# Patient Record
Sex: MALE | Race: WHITE | NOT HISPANIC OR LATINO | Employment: FULL TIME | URBAN - METROPOLITAN AREA
[De-identification: names, ages, dates, MRNs, and addresses within clinical notes are randomized per-mention and may not be internally consistent; named-entity substitution may affect disease eponyms.]

---

## 2017-01-15 DIAGNOSIS — R60.0 BILATERAL EDEMA OF LOWER EXTREMITY: ICD-10-CM

## 2017-01-16 RX ORDER — POTASSIUM CHLORIDE 750 MG/1
CAPSULE, EXTENDED RELEASE ORAL
Qty: 30 CAPSULE | Refills: 5 | Status: SHIPPED | OUTPATIENT
Start: 2017-01-16 | End: 2017-09-12 | Stop reason: SDUPTHER

## 2017-01-16 RX ORDER — FUROSEMIDE 40 MG/1
TABLET ORAL
Qty: 30 TABLET | Refills: 5 | Status: SHIPPED | OUTPATIENT
Start: 2017-01-16 | End: 2017-09-12

## 2017-02-20 RX ORDER — BENAZEPRIL HYDROCHLORIDE 40 MG/1
TABLET, FILM COATED ORAL
Qty: 90 TABLET | Refills: 3 | Status: SHIPPED | OUTPATIENT
Start: 2017-02-20 | End: 2017-09-12 | Stop reason: SDUPTHER

## 2017-05-16 ENCOUNTER — RESULTS ENCOUNTER (OUTPATIENT)
Dept: FAMILY MEDICINE CLINIC | Facility: CLINIC | Age: 47
End: 2017-05-16

## 2017-05-16 DIAGNOSIS — E78.1 HIGH BLOOD TRIGLYCERIDES: ICD-10-CM

## 2017-05-16 DIAGNOSIS — I15.2 HYPERTENSION DUE TO ENDOCRINE DISORDER: ICD-10-CM

## 2017-09-06 LAB
ALBUMIN SERPL-MCNC: 4.5 G/DL (ref 3.5–5.2)
ALBUMIN/GLOB SERPL: 1.5 G/DL
ALP SERPL-CCNC: 70 U/L (ref 40–129)
ALT SERPL-CCNC: 29 U/L (ref 5–41)
APPEARANCE UR: CLEAR
AST SERPL-CCNC: 24 U/L (ref 5–40)
BILIRUB SERPL-MCNC: 0.7 MG/DL (ref 0.2–1.2)
BILIRUB UR QL STRIP: NEGATIVE
BUN SERPL-MCNC: 18 MG/DL (ref 6–20)
BUN/CREAT SERPL: 17.1 (ref 7–25)
CALCIUM SERPL-MCNC: 9.9 MG/DL (ref 8.6–10.5)
CHLORIDE SERPL-SCNC: 97 MMOL/L (ref 98–107)
CHOLEST SERPL-MCNC: 188 MG/DL (ref 0–200)
CO2 SERPL-SCNC: 28.4 MMOL/L (ref 22–29)
COLOR UR: YELLOW
CREAT SERPL-MCNC: 1.05 MG/DL (ref 0.76–1.27)
ERYTHROCYTE [DISTWIDTH] IN BLOOD BY AUTOMATED COUNT: 13.4 % (ref 11.5–14.5)
GLOBULIN SER CALC-MCNC: 3 GM/DL
GLUCOSE SERPL-MCNC: 91 MG/DL (ref 65–99)
GLUCOSE UR QL: NEGATIVE
HCT VFR BLD AUTO: 47.9 % (ref 42–52)
HDLC SERPL-MCNC: 37 MG/DL (ref 40–60)
HGB BLD-MCNC: 15.9 G/DL (ref 14–18)
HGB UR QL STRIP: NEGATIVE
KETONES UR QL STRIP: NEGATIVE
LDLC SERPL CALC-MCNC: 102 MG/DL (ref 0–100)
LEUKOCYTE ESTERASE UR QL STRIP: NEGATIVE
MCH RBC QN AUTO: 28.6 PG (ref 27–31)
MCHC RBC AUTO-ENTMCNC: 33.2 G/DL (ref 31–37)
MCV RBC AUTO: 86.3 FL (ref 80–94)
NITRITE UR QL STRIP: NEGATIVE
PH UR STRIP: 5.5 [PH] (ref 4.5–8)
PLATELET # BLD AUTO: 248 10*3/MM3 (ref 140–500)
POTASSIUM SERPL-SCNC: 4.6 MMOL/L (ref 3.5–5.2)
PROT SERPL-MCNC: 7.5 G/DL (ref 6–8.5)
PROT UR QL STRIP: NEGATIVE
RBC # BLD AUTO: 5.55 10*6/MM3 (ref 4.7–6.1)
SODIUM SERPL-SCNC: 139 MMOL/L (ref 136–145)
SP GR UR: 1.03 (ref 1–1.03)
TRIGL SERPL-MCNC: 245 MG/DL (ref 0–150)
TSH SERPL DL<=0.005 MIU/L-ACNC: 1.11 MIU/ML (ref 0.27–4.2)
UROBILINOGEN UR STRIP-MCNC: NORMAL MG/DL
VLDLC SERPL CALC-MCNC: 49 MG/DL (ref 8–32)
WBC # BLD AUTO: 8.95 10*3/MM3 (ref 4.8–10.8)

## 2017-09-12 ENCOUNTER — OFFICE VISIT (OUTPATIENT)
Dept: FAMILY MEDICINE CLINIC | Facility: CLINIC | Age: 47
End: 2017-09-12

## 2017-09-12 VITALS
OXYGEN SATURATION: 98 % | SYSTOLIC BLOOD PRESSURE: 140 MMHG | BODY MASS INDEX: 40.43 KG/M2 | DIASTOLIC BLOOD PRESSURE: 86 MMHG | TEMPERATURE: 97.9 F | HEIGHT: 74 IN | HEART RATE: 86 BPM | WEIGHT: 315 LBS

## 2017-09-12 DIAGNOSIS — R60.0 BILATERAL EDEMA OF LOWER EXTREMITY: ICD-10-CM

## 2017-09-12 DIAGNOSIS — F41.1 ANXIETY, GENERALIZED: Primary | ICD-10-CM

## 2017-09-12 DIAGNOSIS — I15.2 HYPERTENSION DUE TO ENDOCRINE DISORDER: ICD-10-CM

## 2017-09-12 DIAGNOSIS — Z30.09 VASECTOMY EVALUATION: ICD-10-CM

## 2017-09-12 PROCEDURE — 99214 OFFICE O/P EST MOD 30 MIN: CPT | Performed by: FAMILY MEDICINE

## 2017-09-12 RX ORDER — SPIRONOLACTONE 25 MG/1
25 TABLET ORAL DAILY
Qty: 90 TABLET | Refills: 3 | Status: SHIPPED | OUTPATIENT
Start: 2017-09-12 | End: 2018-09-19 | Stop reason: SDUPTHER

## 2017-09-12 RX ORDER — VENLAFAXINE HYDROCHLORIDE 75 MG/1
75 CAPSULE, EXTENDED RELEASE ORAL DAILY
Qty: 30 CAPSULE | Refills: 5 | Status: SHIPPED | OUTPATIENT
Start: 2017-09-12 | End: 2017-11-16 | Stop reason: DRUGHIGH

## 2017-09-12 RX ORDER — FUROSEMIDE 40 MG/1
40 TABLET ORAL DAILY PRN
Qty: 30 TABLET | Refills: 5 | Status: SHIPPED | OUTPATIENT
Start: 2017-09-12 | End: 2018-03-11 | Stop reason: SDUPTHER

## 2017-09-12 RX ORDER — BENAZEPRIL HYDROCHLORIDE 40 MG/1
40 TABLET, FILM COATED ORAL DAILY
Qty: 90 TABLET | Refills: 3 | Status: SHIPPED | OUTPATIENT
Start: 2017-09-12 | End: 2018-10-09 | Stop reason: SDUPTHER

## 2017-09-12 RX ORDER — VENLAFAXINE HYDROCHLORIDE 37.5 MG/1
37.5 CAPSULE, EXTENDED RELEASE ORAL DAILY
Qty: 7 CAPSULE | Refills: 0 | Status: SHIPPED | OUTPATIENT
Start: 2017-09-12 | End: 2017-11-16 | Stop reason: DRUGHIGH

## 2017-09-12 RX ORDER — POTASSIUM CHLORIDE 750 MG/1
10 CAPSULE, EXTENDED RELEASE ORAL DAILY PRN
Qty: 30 CAPSULE | Refills: 5 | Status: SHIPPED | OUTPATIENT
Start: 2017-09-12 | End: 2018-03-11 | Stop reason: SDUPTHER

## 2017-09-12 NOTE — PROGRESS NOTES
Chief Complaint   Patient presents with   • Follow-up   • Hypertension     uncontrolled, ran out of meds   • Anxiety     uncontrolled       Subjective     Patient here for follow-up of elevated blood pressure.    He is not exercising and is adherent to a low-salt diet.    Blood pressure is not well controlled at home.   Cardiac symptoms: dyspnea, fatigue and lower extremity edema.   Patient denies: chest pain.   Cardiovascular risk factors: hypertension, male gender and obesity (BMI >= 30 kg/m2).   Use of agents associated with hypertension: none.   History of target organ damage: none.  Patient is NOT taking prescribed hypertension medications as prescribed without side effects.    Ran out of Aldactone and lasix.    He and wife have decided for a vasectomy  They have 2 children  Each pregnancy was high risk    Anxiety is not much better  Wellbutrin not helping much      The following portions of the patient's history were reviewed and updated as appropriate: allergies, current medications, past family history, past medical history, past social history, past surgical history and problem list.    Review of Systems  A comprehensive review of systems was negative except for: Behavioral/Psych: positive for anxiety    Results for orders placed or performed in visit on 05/16/17   Lipid Panel   Result Value Ref Range    Total Cholesterol 188 0 - 200 mg/dL    Triglycerides 245 (H) 0 - 150 mg/dL    HDL Cholesterol 37 (L) 40 - 60 mg/dL    VLDL Cholesterol 49 (H) 8 - 32 mg/dL    LDL Cholesterol  102 (H) 0 - 100 mg/dL   CBC (No Diff)   Result Value Ref Range    WBC 8.95 4.80 - 10.80 10*3/mm3    RBC 5.55 4.70 - 6.10 10*6/mm3    Hemoglobin 15.9 14.0 - 18.0 g/dL    Hematocrit 47.9 42.0 - 52.0 %    MCV 86.3 80.0 - 94.0 fL    MCH 28.6 27.0 - 31.0 pg    MCHC 33.2 31.0 - 37.0 g/dL    RDW 13.4 11.5 - 14.5 %    Platelets 248 140 - 500 10*3/mm3   Comprehensive Metabolic Panel   Result Value Ref Range    Glucose 91 65 - 99 mg/dL    BUN  "18 6 - 20 mg/dL    Creatinine 1.05 0.76 - 1.27 mg/dL    eGFR Non African Am 76 >60 mL/min/1.73    eGFR African Am 93 >60 mL/min/1.73    BUN/Creatinine Ratio 17.1 7.0 - 25.0    Sodium 139 136 - 145 mmol/L    Potassium 4.6 3.5 - 5.2 mmol/L    Chloride 97 (L) 98 - 107 mmol/L    Total CO2 28.4 22.0 - 29.0 mmol/L    Calcium 9.9 8.6 - 10.5 mg/dL    Total Protein 7.5 6.0 - 8.5 g/dL    Albumin 4.50 3.50 - 5.20 g/dL    Globulin 3.0 gm/dL    A/G Ratio 1.5 g/dL    Total Bilirubin 0.7 0.2 - 1.2 mg/dL    Alkaline Phosphatase 70 40 - 129 U/L    AST (SGOT) 24 5 - 40 U/L    ALT (SGPT) 29 5 - 41 U/L   TSH   Result Value Ref Range    TSH 1.110 0.270 - 4.200 mIU/mL   Urinalysis With / Microscopic If Indicated   Result Value Ref Range    Specific Gravity, UA 1.029 1.003 - 1.030    pH, UA 5.5 4.5 - 8.0    Color, UA Yellow     Appearance, UA Clear Clear    Leukocytes, UA Negative Negative    Protein Negative Negative    Glucose, UA Negative Negative    Ketones Negative     Blood, UA Negative Negative    Bilirubin, UA Negative Negative    Urobilinogen, UA Comment     Nitrite, UA Negative Negative        Vitals:    09/12/17 1604   BP: 140/86   BP Location: Left arm   Patient Position: Sitting   Pulse: 86   Temp: 97.9 °F (36.6 °C)   SpO2: 98%   Weight: (!) 405 lb 9.6 oz (184 kg)   Height: 74\" (188 cm)     Objective    Gen: alert, pleasant.  HEENT: PERRL, EOMI intact, lids ok, ear canals clear, TMs normal, throat clear, nostrils normal  Neck: no bruit, no enlarged thyroid  Lungs: CTA  Heart: RR no murmur  ABD: soft , + BS  Pulses: intact  Mood: stable     Assessment/Plan   Hypertension, prehypertension Evidence of target organ damage: none.    Jerome was seen today for follow-up, hypertension and anxiety.    Diagnoses and all orders for this visit:    Anxiety, generalized  -     venlafaxine XR (EFFEXOR XR) 37.5 MG 24 hr capsule; Take 1 capsule by mouth Daily.  -     venlafaxine XR (EFFEXOR XR) 75 MG 24 hr capsule; Take 1 capsule by mouth " Daily.    Bilateral edema of lower extremity  -     potassium chloride (MICRO-K) 10 MEQ CR capsule; Take 1 capsule by mouth Daily As Needed (take with lasix).  -     furosemide (LASIX) 40 MG tablet; Take 1 tablet by mouth Daily As Needed (leg swelling).    Hypertension due to endocrine disorder  -     spironolactone (ALDACTONE) 25 MG tablet; Take 1 tablet by mouth Daily. Indications: Edema  -     benazepril (LOTENSIN) 40 MG tablet; Take 1 tablet by mouth Daily. Indications: High Blood Pressure Disorder    Vasectomy evaluation  -     Ambulatory Referral to Urology      Medication: resume aldactone.  Dietary sodium restriction.  Follow up: 1 month and as needed.    There are no Patient Instructions on file for this visit.  Medications Discontinued During This Encounter   Medication Reason   • furosemide (LASIX) 40 MG tablet Therapy completed   • potassium chloride (MICRO-K) 10 MEQ CR capsule Duplicate order   • spironolactone (ALDACTONE) 25 MG tablet Reorder   • potassium chloride (MICRO-K) 10 MEQ CR capsule Reorder   • benazepril (LOTENSIN) 40 MG tablet Reorder   • buPROPion XL (WELLBUTRIN XL) 300 MG 24 hr tablet Not Efficacious        Return in about 1 month (around 10/12/2017) for blood pressure, new medication follow up.    Limit salt  Limit alcoholic drinks to 1 a day  Limit caffeine to 1-2 servings a day    Dr. Shahab Sheriff MD  [place], [place]  [facility].

## 2017-11-16 DIAGNOSIS — F41.1 ANXIETY, GENERALIZED: ICD-10-CM

## 2017-11-16 RX ORDER — VENLAFAXINE HYDROCHLORIDE 75 MG/1
75 CAPSULE, EXTENDED RELEASE ORAL DAILY
Qty: 90 CAPSULE | Refills: 0 | Status: SHIPPED | OUTPATIENT
Start: 2017-11-16 | End: 2018-10-09

## 2017-11-27 RX ORDER — BUPROPION HYDROCHLORIDE 300 MG/1
TABLET ORAL
Qty: 90 TABLET | Refills: 0 | Status: SHIPPED | OUTPATIENT
Start: 2017-11-27 | End: 2018-02-26 | Stop reason: SDUPTHER

## 2018-02-26 RX ORDER — BUPROPION HYDROCHLORIDE 300 MG/1
TABLET ORAL
Qty: 90 TABLET | Refills: 0 | Status: SHIPPED | OUTPATIENT
Start: 2018-02-26 | End: 2018-06-15 | Stop reason: SDUPTHER

## 2018-03-11 DIAGNOSIS — R60.0 BILATERAL EDEMA OF LOWER EXTREMITY: ICD-10-CM

## 2018-03-12 RX ORDER — POTASSIUM CHLORIDE 750 MG/1
10 CAPSULE, EXTENDED RELEASE ORAL DAILY PRN
Qty: 30 CAPSULE | Refills: 0 | Status: SHIPPED | OUTPATIENT
Start: 2018-03-12 | End: 2018-10-09

## 2018-03-12 RX ORDER — FUROSEMIDE 40 MG/1
40 TABLET ORAL DAILY PRN
Qty: 30 TABLET | Refills: 0 | Status: SHIPPED | OUTPATIENT
Start: 2018-03-12

## 2018-06-15 RX ORDER — BUPROPION HYDROCHLORIDE 300 MG/1
TABLET ORAL
Qty: 90 TABLET | Refills: 0 | Status: SHIPPED | OUTPATIENT
Start: 2018-06-15 | End: 2018-09-20 | Stop reason: SDUPTHER

## 2018-09-19 DIAGNOSIS — I15.2 HYPERTENSION DUE TO ENDOCRINE DISORDER: ICD-10-CM

## 2018-09-19 RX ORDER — SPIRONOLACTONE 25 MG/1
25 TABLET ORAL DAILY
Qty: 30 TABLET | Refills: 0 | Status: SHIPPED | OUTPATIENT
Start: 2018-09-19 | End: 2018-10-09 | Stop reason: SDUPTHER

## 2018-09-20 RX ORDER — BUPROPION HYDROCHLORIDE 300 MG/1
TABLET ORAL
Qty: 30 TABLET | Refills: 0 | Status: SHIPPED | OUTPATIENT
Start: 2018-09-20 | End: 2018-10-09

## 2018-10-09 ENCOUNTER — OFFICE VISIT (OUTPATIENT)
Dept: FAMILY MEDICINE CLINIC | Facility: CLINIC | Age: 48
End: 2018-10-09

## 2018-10-09 VITALS
HEART RATE: 85 BPM | WEIGHT: 315 LBS | DIASTOLIC BLOOD PRESSURE: 80 MMHG | SYSTOLIC BLOOD PRESSURE: 136 MMHG | TEMPERATURE: 97.8 F | HEIGHT: 74 IN | RESPIRATION RATE: 22 BRPM | OXYGEN SATURATION: 98 % | BODY MASS INDEX: 40.43 KG/M2

## 2018-10-09 DIAGNOSIS — F41.0 PANIC ATTACK: ICD-10-CM

## 2018-10-09 DIAGNOSIS — F41.1 ANXIETY, GENERALIZED: Primary | ICD-10-CM

## 2018-10-09 DIAGNOSIS — I15.2 HYPERTENSION DUE TO ENDOCRINE DISORDER: ICD-10-CM

## 2018-10-09 PROCEDURE — 99213 OFFICE O/P EST LOW 20 MIN: CPT | Performed by: FAMILY MEDICINE

## 2018-10-09 RX ORDER — SPIRONOLACTONE 25 MG/1
25 TABLET ORAL EVERY MORNING
Qty: 90 TABLET | Refills: 1 | Status: SHIPPED | OUTPATIENT
Start: 2018-10-09 | End: 2019-05-02 | Stop reason: SDUPTHER

## 2018-10-09 RX ORDER — BENAZEPRIL HYDROCHLORIDE 40 MG/1
40 TABLET, FILM COATED ORAL DAILY
Qty: 90 TABLET | Refills: 3 | Status: SHIPPED | OUTPATIENT
Start: 2018-10-09 | End: 2019-10-25 | Stop reason: SDUPTHER

## 2018-10-09 NOTE — PROGRESS NOTES
"  Chief Complaint   Patient presents with   • Anxiety       Subjective     Patient here for follow-up of elevated blood pressure.    He is not exercising and is not adherent to a low-salt diet.    Blood pressure is well controlled at home.   Cardiac symptoms: dyspnea, fatigue, lower extremity edema and weight gain.   Patient denies: chest pain.   Cardiovascular risk factors: dyslipidemia, hypertension, male gender and obesity (BMI >= 30 kg/m2).   Use of agents associated with hypertension: none.   History of target organ damage: none.  Patient is taking prescribed hypertension medications as prescribed without side effects.    Anxiety is still present  The Effexor was too activating for him  Still on Wellbutrin 300, not helping much  Willing to try a new med  Discussed weaning off Wellbutrin , take 1/2 pill a day for 6 days.    Not needing the lasix now    The following portions of the patient's history were reviewed and updated as appropriate: allergies, current medications, past family history, past medical history, past social history, past surgical history and problem list.    Review of Systems  Pertinent items are noted in HPI.         Vitals:    10/09/18 1511   BP: 136/80   Pulse: 85   Resp: 22   Temp: 97.8 °F (36.6 °C)   SpO2: 98%   Weight: (!) 191 kg (422 lb)   Height: 188 cm (74\")     BP Readings from Last 3 Encounters:   10/09/18 136/80   09/12/17 140/86   12/16/16 138/66     Objective      Gen: alert, pleasant.  HEENT: PERRL, EOMI intact, lids ok, ear canals clear, TMs normal, throat clear, nostrils normal  Neck: no bruit, no enlarged thyroid  Lungs: CTA  Heart: RR no murmur  ABD: soft , + BS  Pulses: intact  Mood: stable     Assessment/Plan   Hypertension, normal blood pressure Evidence of target organ damage: none.    Jerome was seen today for anxiety.    Diagnoses and all orders for this visit:    Anxiety, generalized  -     sertraline (ZOLOFT) 50 MG tablet; Take 1 tablet by mouth Daily.    Hypertension " due to endocrine disorder  -     benazepril (LOTENSIN) 40 MG tablet; Take 1 tablet by mouth Daily.  -     spironolactone (ALDACTONE) 25 MG tablet; Take 1 tablet by mouth Every Morning.  -     Lipid Panel; Future  -     CBC (No Diff); Future  -     Comprehensive Metabolic Panel; Future  -     TSH; Future    Panic attack  -     sertraline (ZOLOFT) 50 MG tablet; Take 1 tablet by mouth Daily.      Medication: no change.  Follow up: 6 months and as needed.    There are no Patient Instructions on file for this visit.  Medications Discontinued During This Encounter   Medication Reason   • venlafaxine XR (EFFEXOR XR) 75 MG 24 hr capsule *Therapy completed   • Misc Natural Products (PROSTATE) capsule *Therapy completed   • diazePAM (VALIUM) 5 MG tablet *Therapy completed   • buPROPion XL (WELLBUTRIN XL) 300 MG 24 hr tablet Not Efficacious   • potassium chloride (MICRO-K) 10 MEQ CR capsule *Therapy completed   • benazepril (LOTENSIN) 40 MG tablet Reorder   • spironolactone (ALDACTONE) 25 MG tablet Reorder        Return in about 6 months (around 4/9/2019) for blood pressure, Annual physical.    Limit salt  Limit alcoholic drinks to 1 a day  Limit caffeine to 1-2 servings a day    Dr. Shahab Sheriff MD  [place], [place]  [facility].

## 2018-10-14 ENCOUNTER — RESULTS ENCOUNTER (OUTPATIENT)
Dept: FAMILY MEDICINE CLINIC | Facility: CLINIC | Age: 48
End: 2018-10-14

## 2018-10-14 DIAGNOSIS — I15.2 HYPERTENSION DUE TO ENDOCRINE DISORDER: ICD-10-CM

## 2019-04-24 LAB
ALBUMIN SERPL-MCNC: 4.5 G/DL (ref 3.5–5.2)
ALBUMIN/GLOB SERPL: 1.5 G/DL
ALP SERPL-CCNC: 67 U/L (ref 39–117)
ALT SERPL-CCNC: 26 U/L (ref 1–41)
AST SERPL-CCNC: 23 U/L (ref 1–40)
BILIRUB SERPL-MCNC: 0.6 MG/DL (ref 0.2–1.2)
BUN SERPL-MCNC: 12 MG/DL (ref 6–20)
BUN/CREAT SERPL: 12.8 (ref 7–25)
CALCIUM SERPL-MCNC: 10 MG/DL (ref 8.6–10.5)
CHLORIDE SERPL-SCNC: 100 MMOL/L (ref 98–107)
CHOLEST SERPL-MCNC: 185 MG/DL (ref 0–200)
CO2 SERPL-SCNC: 25.2 MMOL/L (ref 22–29)
CREAT SERPL-MCNC: 0.94 MG/DL (ref 0.76–1.27)
ERYTHROCYTE [DISTWIDTH] IN BLOOD BY AUTOMATED COUNT: 13.9 % (ref 12.3–15.4)
GLOBULIN SER CALC-MCNC: 3 GM/DL
GLUCOSE SERPL-MCNC: 97 MG/DL (ref 65–99)
HCT VFR BLD AUTO: 45.5 % (ref 37.5–51)
HDLC SERPL-MCNC: 37 MG/DL (ref 40–60)
HGB BLD-MCNC: 14.6 G/DL (ref 13–17.7)
LDLC SERPL CALC-MCNC: 93 MG/DL (ref 0–100)
MCH RBC QN AUTO: 28.4 PG (ref 26.6–33)
MCHC RBC AUTO-ENTMCNC: 32.1 G/DL (ref 31.5–35.7)
MCV RBC AUTO: 88.5 FL (ref 79–97)
PLATELET # BLD AUTO: 210 10*3/MM3 (ref 140–450)
POTASSIUM SERPL-SCNC: 4.4 MMOL/L (ref 3.5–5.2)
PROT SERPL-MCNC: 7.5 G/DL (ref 6–8.5)
RBC # BLD AUTO: 5.14 10*6/MM3 (ref 4.14–5.8)
SODIUM SERPL-SCNC: 136 MMOL/L (ref 136–145)
TRIGL SERPL-MCNC: 277 MG/DL (ref 0–150)
TSH SERPL DL<=0.005 MIU/L-ACNC: 1.11 MIU/ML (ref 0.27–4.2)
VLDLC SERPL CALC-MCNC: 55.4 MG/DL
WBC # BLD AUTO: 7.8 10*3/MM3 (ref 3.4–10.8)

## 2019-04-26 DIAGNOSIS — F41.1 ANXIETY, GENERALIZED: ICD-10-CM

## 2019-04-26 DIAGNOSIS — F41.0 PANIC ATTACK: ICD-10-CM

## 2019-05-02 DIAGNOSIS — I15.2 HYPERTENSION DUE TO ENDOCRINE DISORDER: ICD-10-CM

## 2019-05-02 RX ORDER — SPIRONOLACTONE 25 MG/1
TABLET ORAL
Qty: 90 TABLET | Refills: 0 | Status: SHIPPED | OUTPATIENT
Start: 2019-05-02 | End: 2019-05-14 | Stop reason: SDUPTHER

## 2019-05-14 ENCOUNTER — OFFICE VISIT (OUTPATIENT)
Dept: FAMILY MEDICINE CLINIC | Facility: CLINIC | Age: 49
End: 2019-05-14

## 2019-05-14 VITALS
BODY MASS INDEX: 40.43 KG/M2 | TEMPERATURE: 97.9 F | HEIGHT: 74 IN | SYSTOLIC BLOOD PRESSURE: 140 MMHG | RESPIRATION RATE: 17 BRPM | DIASTOLIC BLOOD PRESSURE: 92 MMHG | HEART RATE: 89 BPM | WEIGHT: 315 LBS | OXYGEN SATURATION: 98 %

## 2019-05-14 DIAGNOSIS — E66.01 MORBID OBESITY DUE TO EXCESS CALORIES (HCC): ICD-10-CM

## 2019-05-14 DIAGNOSIS — E78.1 HIGH BLOOD TRIGLYCERIDES: ICD-10-CM

## 2019-05-14 DIAGNOSIS — F41.1 ANXIETY, GENERALIZED: Primary | ICD-10-CM

## 2019-05-14 DIAGNOSIS — Z00.00 ROUTINE ADULT HEALTH MAINTENANCE: ICD-10-CM

## 2019-05-14 DIAGNOSIS — F41.0 PANIC ATTACK: ICD-10-CM

## 2019-05-14 DIAGNOSIS — I15.2 HYPERTENSION DUE TO ENDOCRINE DISORDER: ICD-10-CM

## 2019-05-14 PROCEDURE — 99396 PREV VISIT EST AGE 40-64: CPT | Performed by: FAMILY MEDICINE

## 2019-05-14 RX ORDER — SPIRONOLACTONE 25 MG/1
50 TABLET ORAL EVERY MORNING
Qty: 90 TABLET | Refills: 3 | Status: SHIPPED | OUTPATIENT
Start: 2019-05-14 | End: 2020-01-06

## 2019-05-14 RX ORDER — SERTRALINE HYDROCHLORIDE 100 MG/1
100 TABLET, FILM COATED ORAL DAILY
Qty: 30 TABLET | Refills: 5 | Status: SHIPPED | OUTPATIENT
Start: 2019-05-14 | End: 2019-11-18 | Stop reason: SDUPTHER

## 2019-05-14 NOTE — PROGRESS NOTES
"  Chief Complaint   Patient presents with   • Annual Exam   • Depression   • Hypertension       Subjective   Jerome Sandoval is a 48 y.o. male and is here for a yearly physical exam. The patient reports problems - weight is up.    Depression: Patient complains of depression. He complains of anhedonia and depressed mood. Onset was approximately several years ago, gradually worsening since that time.  He denies current suicidal and homicidal plan or intent.   Family history significant for depression.Possible organic causes contributing are: none.  Risk factors: negative life event he and wife are seperated, she moved out witih the kids to her mothers Previous treatment includes Zoloft and group therapy, individual therapy and medication. He complains of the following side effects from the treatment: none.      Do you take any herbs or supplements that were not prescribed by a doctor? yes. If so, these will be added to active medication list.    The following portions of the patient's history were reviewed and updated as appropriate: allergies, current medications, past family history, past medical history, past social history, past surgical history and problem list.    Social and Family and Surgical History reviewed and updated today, see Rooming tab.    Health History, Preventive Measures and Vaccination flow sheets reviewed and updated today.    Patient's current medical chart in Epic; including previous office notes, imaging, labs, specialist's evaluation either in notes or in Media tab reviewed today.    Other pertinent medical information also reviewed thru Care Everywhere function is also reviewed today.    Review of Systems  Review of Systems  A comprehensive review of systems was negative except for: Behavioral/Psych: positive for depression and obesity    Vitals:    05/14/19 1512   BP: 140/92   Pulse: 89   Resp: 17   Temp: 97.9 °F (36.6 °C)   SpO2: 98%   Weight: (!) 196 kg (432 lb)   Height: 188 cm (74\") "       General Appearance:  Alert, cooperative, no distress, appears stated age   Head:  Normocephalic, without obvious abnormality, atraumatic   Eyes:  PERRL, conjunctiva/corneas clear, EOM's intact.   Ears:  Normal TM's and external ear canals, both ears   Nose: Nares normal, septum midline, mucosa normal, no drainage or sinus tenderness   Throat: Lips, mucosa, and tongue normal; teeth and gums normal   Neck: Supple, symmetrical, trachea midline, no adenopathy;   thyroid: No enlargement/tenderness/nodules; no carotid  bruit   Back:  Symmetric, no curvature, ROM normal, no CVA tenderness   Lungs:  Clear to auscultation bilaterally, respirations unlabored   Chest wall:  No tenderness or deformity   Heart:  Regular rate and rhythm, S1 and S2 normal, no murmur, rub or gallop   Abdomen:  Soft, non-tender, bowel sounds active all four quadrants,   no masses, no organomegaly   Rectal:        Extremities: Extremities normal, atraumatic, no cyanosis or edema   Pulses: 2+ and symmetric all extremities   Skin: Skin color, texture, turgor normal, no rashes or lesions   Lymph nodes: Cervical, supraclavicular, and axillary nodes normal   Neurologic: CNII-XII intact. Normal strength, sensation and reflexes   throughout          Results for orders placed or performed in visit on 10/14/18   Lipid Panel   Result Value Ref Range    Total Cholesterol 185 0 - 200 mg/dL    Triglycerides 277 (H) 0 - 150 mg/dL    HDL Cholesterol 37 (L) 40 - 60 mg/dL    VLDL Cholesterol 55.4 mg/dL    LDL Cholesterol  93 0 - 100 mg/dL   CBC (No Diff)   Result Value Ref Range    WBC 7.80 3.40 - 10.80 10*3/mm3    RBC 5.14 4.14 - 5.80 10*6/mm3    Hemoglobin 14.6 13.0 - 17.7 g/dL    Hematocrit 45.5 37.5 - 51.0 %    MCV 88.5 79.0 - 97.0 fL    MCH 28.4 26.6 - 33.0 pg    MCHC 32.1 31.5 - 35.7 g/dL    RDW 13.9 12.3 - 15.4 %    Platelets 210 140 - 450 10*3/mm3   Comprehensive Metabolic Panel   Result Value Ref Range    Glucose 97 65 - 99 mg/dL    BUN 12 6 - 20 mg/dL     Creatinine 0.94 0.76 - 1.27 mg/dL    eGFR Non African Am 86 >60 mL/min/1.73    eGFR African Am 104 >60 mL/min/1.73    BUN/Creatinine Ratio 12.8 7.0 - 25.0    Sodium 136 136 - 145 mmol/L    Potassium 4.4 3.5 - 5.2 mmol/L    Chloride 100 98 - 107 mmol/L    Total CO2 25.2 22.0 - 29.0 mmol/L    Calcium 10.0 8.6 - 10.5 mg/dL    Total Protein 7.5 6.0 - 8.5 g/dL    Albumin 4.50 3.50 - 5.20 g/dL    Globulin 3.0 gm/dL    A/G Ratio 1.5 g/dL    Total Bilirubin 0.6 0.2 - 1.2 mg/dL    Alkaline Phosphatase 67 39 - 117 U/L    AST (SGOT) 23 1 - 40 U/L    ALT (SGPT) 26 1 - 41 U/L   TSH   Result Value Ref Range    TSH 1.110 0.270 - 4.200 mIU/mL     Assessment/Plan   Healthy male exam.  Jerome was seen today for annual exam, depression and hypertension.    Diagnoses and all orders for this visit:    Anxiety, generalized  -     sertraline (ZOLOFT) 100 MG tablet; Take 1 tablet by mouth Daily.    High blood triglycerides    Hypertension due to endocrine disorder  -     spironolactone (ALDACTONE) 25 MG tablet; Take 2 tablets by mouth Every Morning.    Morbid obesity due to excess calories (CMS/[facility])    Routine adult health maintenance    Panic attack  -     sertraline (ZOLOFT) 100 MG tablet; Take 1 tablet by mouth Daily.      1. Labs ok  Double Zoloft  Double aldactone for edema and bp  2. Patient Counseling:  --Nutrition: Stressed importance of moderation in sodium/caffeine intake, saturated fat and cholesterol.  Discussed caloric balance, sufficient intake of fresh fruits, vegetables, fiber, calcium, iron.  --Discussed the new recommendation against daily use of baby aspirin for primary prevention in low risk patients. He does not need, ok to stop  --Exercise: Stressed the importance of regular exercise.   --Substance Abuse: Discussed cessation/primary prevention of tobacco, alcohol, or other drug use; driving or other dangerous activities under the influence.    --Dental health: Discussed importance of regular tooth brushing,  flossing, and dental visits.  -- suggested having eyes and vision checked if needed or past due.  --Immunizations reviewed.  --  3. Discussed the patient's BMI with him.  The BMI is above average; BMI management plan is completed  4. Follow up in 6 months    There are no Patient Instructions on file for this visit.    Medications Discontinued During This Encounter   Medication Reason   • spironolactone (ALDACTONE) 25 MG tablet Reorder   • aspirin 81 MG tablet *Therapy completed   • sertraline (ZOLOFT) 50 MG tablet Reorder        Dr. Shahab Sheriff MD  [place], [place]  [facility]

## 2019-10-25 DIAGNOSIS — I15.2 HYPERTENSION DUE TO ENDOCRINE DISORDER: ICD-10-CM

## 2019-10-25 RX ORDER — BENAZEPRIL HYDROCHLORIDE 40 MG/1
TABLET, FILM COATED ORAL
Qty: 90 TABLET | Refills: 0 | Status: SHIPPED | OUTPATIENT
Start: 2019-10-25 | End: 2020-01-27

## 2019-11-08 DIAGNOSIS — I15.2 HYPERTENSION DUE TO ENDOCRINE DISORDER: Primary | ICD-10-CM

## 2019-11-14 LAB
ALBUMIN SERPL-MCNC: 4.3 G/DL (ref 3.5–5.5)
ALBUMIN/GLOB SERPL: 1.6 {RATIO} (ref 1.2–2.2)
ALP SERPL-CCNC: 69 IU/L (ref 39–117)
ALT SERPL-CCNC: 28 IU/L (ref 0–44)
APPEARANCE UR: CLEAR
AST SERPL-CCNC: 28 IU/L (ref 0–40)
BACTERIA #/AREA URNS HPF: ABNORMAL /[HPF]
BILIRUB SERPL-MCNC: 0.6 MG/DL (ref 0–1.2)
BILIRUB UR QL STRIP: NEGATIVE
BUN SERPL-MCNC: 12 MG/DL (ref 6–24)
BUN/CREAT SERPL: 13 (ref 9–20)
CALCIUM SERPL-MCNC: 9.6 MG/DL (ref 8.7–10.2)
CHLORIDE SERPL-SCNC: 99 MMOL/L (ref 96–106)
CHOLEST SERPL-MCNC: 169 MG/DL (ref 100–199)
CO2 SERPL-SCNC: 23 MMOL/L (ref 20–29)
COLOR UR: YELLOW
CREAT SERPL-MCNC: 0.95 MG/DL (ref 0.76–1.27)
EPI CELLS #/AREA URNS HPF: ABNORMAL /HPF
ERYTHROCYTE [DISTWIDTH] IN BLOOD BY AUTOMATED COUNT: 13.9 % (ref 12.3–15.4)
GLOBULIN SER CALC-MCNC: 2.7 G/DL (ref 1.5–4.5)
GLUCOSE SERPL-MCNC: 98 MG/DL (ref 65–99)
GLUCOSE UR QL: NEGATIVE
HCT VFR BLD AUTO: 42.9 % (ref 37.5–51)
HDLC SERPL-MCNC: 37 MG/DL
HGB BLD-MCNC: 14.7 G/DL (ref 13–17.7)
HGB UR QL STRIP: ABNORMAL
KETONES UR QL STRIP: NEGATIVE
LDLC SERPL CALC-MCNC: 101 MG/DL (ref 0–99)
LEUKOCYTE ESTERASE UR QL STRIP: ABNORMAL
MCH RBC QN AUTO: 29 PG (ref 26.6–33)
MCHC RBC AUTO-ENTMCNC: 34.3 G/DL (ref 31.5–35.7)
MCV RBC AUTO: 85 FL (ref 79–97)
MICRO URNS: ABNORMAL
MUCOUS THREADS URNS QL MICRO: PRESENT
NITRITE UR QL STRIP: POSITIVE
PH UR STRIP: 5 [PH] (ref 5–7.5)
PLATELET # BLD AUTO: 232 X10E3/UL (ref 150–450)
POTASSIUM SERPL-SCNC: 4.6 MMOL/L (ref 3.5–5.2)
PROT SERPL-MCNC: 7 G/DL (ref 6–8.5)
PROT UR QL STRIP: NEGATIVE
RBC # BLD AUTO: 5.07 X10E6/UL (ref 4.14–5.8)
RBC #/AREA URNS HPF: ABNORMAL /HPF
SODIUM SERPL-SCNC: 139 MMOL/L (ref 134–144)
SP GR UR: 1.02 (ref 1–1.03)
TRIGL SERPL-MCNC: 155 MG/DL (ref 0–149)
UROBILINOGEN UR STRIP-MCNC: 0.2 MG/DL (ref 0.2–1)
VLDLC SERPL CALC-MCNC: 31 MG/DL (ref 5–40)
WBC # BLD AUTO: 8.2 X10E3/UL (ref 3.4–10.8)
WBC #/AREA URNS HPF: >30 /HPF

## 2019-11-18 ENCOUNTER — OFFICE VISIT (OUTPATIENT)
Dept: FAMILY MEDICINE CLINIC | Facility: CLINIC | Age: 49
End: 2019-11-18

## 2019-11-18 VITALS
BODY MASS INDEX: 40.43 KG/M2 | WEIGHT: 315 LBS | HEIGHT: 74 IN | SYSTOLIC BLOOD PRESSURE: 128 MMHG | DIASTOLIC BLOOD PRESSURE: 80 MMHG | OXYGEN SATURATION: 98 % | HEART RATE: 83 BPM

## 2019-11-18 DIAGNOSIS — F41.1 ANXIETY, GENERALIZED: Primary | ICD-10-CM

## 2019-11-18 DIAGNOSIS — I15.2 HYPERTENSION DUE TO ENDOCRINE DISORDER: ICD-10-CM

## 2019-11-18 DIAGNOSIS — F41.0 PANIC ATTACK: ICD-10-CM

## 2019-11-18 PROCEDURE — 99213 OFFICE O/P EST LOW 20 MIN: CPT | Performed by: FAMILY MEDICINE

## 2019-11-18 RX ORDER — SERTRALINE HYDROCHLORIDE 100 MG/1
100 TABLET, FILM COATED ORAL DAILY
Qty: 90 TABLET | Refills: 3 | Status: SHIPPED | OUTPATIENT
Start: 2019-11-18 | End: 2020-12-17

## 2019-11-18 NOTE — PROGRESS NOTES
Chief Complaint   Patient presents with   • Hypertension   • Anxiety       Subjective     Patient here for follow-up of elevated blood pressure.    He is not exercising and is adherent to a low-salt diet.    Blood pressure is well controlled at home.   Cardiac symptoms: dyspnea, fatigue and lower extremity edema.   Patient denies: chest pain.   Cardiovascular risk factors: hypertension, male gender and obesity (BMI >= 30 kg/m2).   Use of agents associated with hypertension: none.   History of target organ damage: none.  Patient is taking prescribed hypertension medications as prescribed without side effects.    The following portions of the patient's history were reviewed and updated as appropriate: allergies, current medications, past family history, past medical history, past social history, past surgical history and problem list.    Review of Systems  Pertinent items are noted in HPI.    Results for orders placed or performed in visit on 11/08/19   Comprehensive Metabolic Panel   Result Value Ref Range    Glucose 98 65 - 99 mg/dL    BUN 12 6 - 24 mg/dL    Creatinine 0.95 0.76 - 1.27 mg/dL    eGFR Non African Am 94 >59 mL/min/1.73    eGFR African Am 109 >59 mL/min/1.73    BUN/Creatinine Ratio 13 9 - 20    Sodium 139 134 - 144 mmol/L    Potassium 4.6 3.5 - 5.2 mmol/L    Chloride 99 96 - 106 mmol/L    Total CO2 23 20 - 29 mmol/L    Calcium 9.6 8.7 - 10.2 mg/dL    Total Protein 7.0 6.0 - 8.5 g/dL    Albumin 4.3 3.5 - 5.5 g/dL    Globulin 2.7 1.5 - 4.5 g/dL    A/G Ratio 1.6 1.2 - 2.2    Total Bilirubin 0.6 0.0 - 1.2 mg/dL    Alkaline Phosphatase 69 39 - 117 IU/L    AST (SGOT) 28 0 - 40 IU/L    ALT (SGPT) 28 0 - 44 IU/L   CBC (No Diff)   Result Value Ref Range    WBC 8.2 3.4 - 10.8 x10E3/uL    RBC 5.07 4.14 - 5.80 x10E6/uL    Hemoglobin 14.7 13.0 - 17.7 g/dL    Hematocrit 42.9 37.5 - 51.0 %    MCV 85 79 - 97 fL    MCH 29.0 26.6 - 33.0 pg    MCHC 34.3 31.5 - 35.7 g/dL    RDW 13.9 12.3 - 15.4 %    Platelets 232 150 -  "450 x10E3/uL   Lipid Panel   Result Value Ref Range    Total Cholesterol 169 100 - 199 mg/dL    Triglycerides 155 (H) 0 - 149 mg/dL    HDL Cholesterol 37 (L) >39 mg/dL    VLDL Cholesterol 31 5 - 40 mg/dL    LDL Cholesterol  101 (H) 0 - 99 mg/dL   Urinalysis With Microscopic If Indicated (No Culture) - Urine, Clean Catch   Result Value Ref Range    Specific Gravity, UA 1.022 1.005 - 1.030    pH, UA 5.0 5.0 - 7.5    Color, UA Yellow Yellow    Appearance, UA Clear Clear    Leukocytes, UA 2+ (A) Negative    Protein Negative Negative/Trace    Glucose, UA Negative Negative    Ketones Negative Negative    Blood, UA Trace (A) Negative    Bilirubin, UA Negative Negative    Urobilinogen, UA 0.2 0.2 - 1.0 mg/dL    Nitrite, UA Positive (A) Negative    Microscopic Examination See below:    Microscopic Examination -   Result Value Ref Range    WBC, UA >30 (A) 0 - 5 /hpf    RBC, UA 3-10 (A) 0 - 2 /hpf    Epithelial Cells (non renal) 0-10 0 - 10 /hpf    Mucus, UA Present Not Estab.    Bacteria, UA Few None seen/Few        Vitals:    11/18/19 1507   BP: 128/80   BP Location: Left arm   Patient Position: Sitting   Cuff Size: Large Adult   Pulse: 83   SpO2: 98%   Weight: (!) 201 kg (444 lb)   Height: 188 cm (74\")     BP Readings from Last 3 Encounters:   11/18/19 128/80   05/14/19 140/92   10/09/18 136/80     Objective      Gen: alert, pleasant.  HEENT: PERRL, EOMI intact, lids ok, ear canals clear, TMs normal, throat clear, nostrils normal  Neck: no bruit, no enlarged thyroid  Lungs: CTA  Heart: RR no murmur  ABD: soft , + BS  Pulses: intact  Mood: stable     Assessment/Plan   Hypertension, normal blood pressure Evidence of target organ damage: none.    Jerome was seen today for hypertension and anxiety.    Diagnoses and all orders for this visit:    Anxiety, generalized  -     sertraline (ZOLOFT) 100 MG tablet; Take 1 tablet by mouth Daily.    Hypertension due to endocrine disorder    Panic attack  -     sertraline (ZOLOFT) 100 MG " tablet; Take 1 tablet by mouth Daily.      Having a very bad year,   Going thru a divorce  And his mother .    Medication: no change.  Follow up: 6 months and as needed.    There are no Patient Instructions on file for this visit.  Medications Discontinued During This Encounter   Medication Reason   • sertraline (ZOLOFT) 100 MG tablet Reorder        Return in about 6 months (around 2020) for blood pressure.    Limit salt  Limit alcoholic drinks to 1 a day  Limit caffeine to 1-2 servings a day    Dr. Shahab Sheriff MD  [place], [place]  [facility].

## 2019-12-16 ENCOUNTER — TELEPHONE (OUTPATIENT)
Dept: FAMILY MEDICINE CLINIC | Facility: CLINIC | Age: 49
End: 2019-12-16

## 2020-01-05 DIAGNOSIS — I15.2 HYPERTENSION DUE TO ENDOCRINE DISORDER: ICD-10-CM

## 2020-01-06 RX ORDER — SPIRONOLACTONE 25 MG/1
TABLET ORAL
Qty: 180 TABLET | Refills: 1 | Status: SHIPPED | OUTPATIENT
Start: 2020-01-06 | End: 2020-09-10

## 2020-01-26 DIAGNOSIS — I15.2 HYPERTENSION DUE TO ENDOCRINE DISORDER: ICD-10-CM

## 2020-01-27 RX ORDER — BENAZEPRIL HYDROCHLORIDE 40 MG/1
TABLET, FILM COATED ORAL
Qty: 90 TABLET | Refills: 0 | Status: SHIPPED | OUTPATIENT
Start: 2020-01-27 | End: 2020-06-07

## 2020-04-13 ENCOUNTER — TELEPHONE (OUTPATIENT)
Dept: FAMILY MEDICINE CLINIC | Facility: CLINIC | Age: 50
End: 2020-04-13

## 2020-04-13 NOTE — TELEPHONE ENCOUNTER
silvano called in requesting a 's note for his work,  Needs a doctors note for his job stating that he is at risk of the virus cause of obesity, needs a letter stating that he needs a leave of absence, his job has had positive cases of the virus and he is at risk and needs to be away from there, needs it for a few weeks, least until 05-03    Patient can either come by and pick something up    Or through his MyChart    Ok we will send him a letter thru [facility]    Shahab Sheriff MD  .

## 2020-04-16 ENCOUNTER — TELEPHONE (OUTPATIENT)
Dept: FAMILY MEDICINE CLINIC | Facility: CLINIC | Age: 50
End: 2020-04-16

## 2020-04-16 NOTE — TELEPHONE ENCOUNTER
PATIENT CALLING, REQUESTING LETTER FOR LEAVE FOR WORK TO BE UPLOADED TO HIS [employer], HE HASNT RECEIVED IT IN THE MAIL YET.     PATIENT CAN BE REACHED -[phone] TO CLARIFY AND CONFIRM.

## 2020-04-16 NOTE — TELEPHONE ENCOUNTER
PT CALLED STATING THERE WAS NO LETTERS RECEIVED THROUGH [employer].    PLEASE ADVISE     PT CALL BACK   [phone]

## 2020-06-06 DIAGNOSIS — I15.2 HYPERTENSION DUE TO ENDOCRINE DISORDER: ICD-10-CM

## 2020-06-07 RX ORDER — BENAZEPRIL HYDROCHLORIDE 40 MG/1
TABLET, FILM COATED ORAL
Qty: 90 TABLET | Refills: 0 | Status: SHIPPED | OUTPATIENT
Start: 2020-06-07 | End: 2020-10-12

## 2020-08-04 ENCOUNTER — TELEPHONE (OUTPATIENT)
Dept: FAMILY MEDICINE CLINIC | Facility: CLINIC | Age: 50
End: 2020-08-04

## 2020-08-04 NOTE — TELEPHONE ENCOUNTER
Left message on phone to try and reschedule CPE/AWV due to us canceling from May due to covid pandemic.

## 2020-09-10 DIAGNOSIS — I15.2 HYPERTENSION DUE TO ENDOCRINE DISORDER: ICD-10-CM

## 2020-09-10 RX ORDER — SPIRONOLACTONE 25 MG/1
TABLET ORAL
Qty: 60 TABLET | Refills: 5 | Status: SHIPPED | OUTPATIENT
Start: 2020-09-10

## 2020-10-10 DIAGNOSIS — I15.2 HYPERTENSION DUE TO ENDOCRINE DISORDER: ICD-10-CM

## 2020-10-12 RX ORDER — BENAZEPRIL HYDROCHLORIDE 40 MG/1
TABLET, FILM COATED ORAL
Qty: 90 TABLET | Refills: 0 | Status: SHIPPED | OUTPATIENT
Start: 2020-10-12 | End: 2021-01-03

## 2020-12-17 DIAGNOSIS — F41.1 ANXIETY, GENERALIZED: ICD-10-CM

## 2020-12-17 DIAGNOSIS — F41.0 PANIC ATTACK: ICD-10-CM

## 2020-12-17 RX ORDER — SERTRALINE HYDROCHLORIDE 100 MG/1
TABLET, FILM COATED ORAL
Qty: 90 TABLET | Refills: 1 | Status: SHIPPED | OUTPATIENT
Start: 2020-12-17

## 2021-01-03 DIAGNOSIS — I15.2 HYPERTENSION DUE TO ENDOCRINE DISORDER: ICD-10-CM

## 2021-01-03 RX ORDER — BENAZEPRIL HYDROCHLORIDE 40 MG/1
TABLET, FILM COATED ORAL
Qty: 90 TABLET | Refills: 0 | Status: SHIPPED | OUTPATIENT
Start: 2021-01-03

## 2021-04-02 ENCOUNTER — BULK ORDERING (OUTPATIENT)
Dept: CASE MANAGEMENT | Facility: OTHER | Age: 51
End: 2021-04-02

## 2021-04-02 DIAGNOSIS — Z23 IMMUNIZATION DUE: ICD-10-CM

## 2025-02-06 ENCOUNTER — TELEPHONE (OUTPATIENT)
Dept: FAMILY MEDICINE CLINIC | Facility: CLINIC | Age: 55
End: 2025-02-06

## 2025-02-06 ENCOUNTER — OFFICE VISIT (OUTPATIENT)
Dept: FAMILY MEDICINE CLINIC | Facility: CLINIC | Age: 55
End: 2025-02-06
Payer: MEDICAID

## 2025-02-06 VITALS
BODY MASS INDEX: 40.43 KG/M2 | DIASTOLIC BLOOD PRESSURE: 98 MMHG | SYSTOLIC BLOOD PRESSURE: 140 MMHG | HEIGHT: 74 IN | TEMPERATURE: 98.3 F | HEART RATE: 98 BPM | OXYGEN SATURATION: 97 % | RESPIRATION RATE: 16 BRPM | WEIGHT: 315 LBS

## 2025-02-06 DIAGNOSIS — I10 PRIMARY HYPERTENSION: ICD-10-CM

## 2025-02-06 DIAGNOSIS — R60.0 BILATERAL EDEMA OF LOWER EXTREMITY: ICD-10-CM

## 2025-02-06 DIAGNOSIS — Z12.11 COLON CANCER SCREENING: ICD-10-CM

## 2025-02-06 DIAGNOSIS — N48.1 BALANITIS: ICD-10-CM

## 2025-02-06 DIAGNOSIS — L97.921 ULCER OF LEFT LOWER EXTREMITY, LIMITED TO BREAKDOWN OF SKIN: ICD-10-CM

## 2025-02-06 DIAGNOSIS — E11.65 TYPE 2 DIABETES MELLITUS WITH HYPERGLYCEMIA, UNSPECIFIED WHETHER LONG TERM INSULIN USE: Primary | ICD-10-CM

## 2025-02-06 DIAGNOSIS — R22.42 LOCALIZED SWELLING OF LEFT LOWER EXTREMITY: ICD-10-CM

## 2025-02-06 LAB
EXPIRATION DATE: ABNORMAL
HBA1C MFR BLD: 11.2 % (ref 4.5–5.7)
Lab: ABNORMAL

## 2025-02-06 PROCEDURE — 83036 HEMOGLOBIN GLYCOSYLATED A1C: CPT | Performed by: FAMILY MEDICINE

## 2025-02-06 PROCEDURE — 3046F HEMOGLOBIN A1C LEVEL >9.0%: CPT | Performed by: FAMILY MEDICINE

## 2025-02-06 PROCEDURE — 1125F AMNT PAIN NOTED PAIN PRSNT: CPT | Performed by: FAMILY MEDICINE

## 2025-02-06 PROCEDURE — 99204 OFFICE O/P NEW MOD 45 MIN: CPT | Performed by: FAMILY MEDICINE

## 2025-02-06 RX ORDER — MICONAZOLE NITRATE 20 MG/G
1 CREAM TOPICAL 2 TIMES DAILY
Qty: 35 G | Refills: 0 | Status: SHIPPED | OUTPATIENT
Start: 2025-02-06 | End: 2025-02-20

## 2025-02-06 RX ORDER — FUROSEMIDE 40 MG/1
40 TABLET ORAL DAILY PRN
Qty: 90 TABLET | Refills: 0 | Status: SHIPPED | OUTPATIENT
Start: 2025-02-06 | End: 2025-02-06

## 2025-02-06 RX ORDER — ATORVASTATIN CALCIUM 20 MG/1
20 TABLET, FILM COATED ORAL DAILY
Qty: 90 TABLET | Refills: 3 | Status: SHIPPED | OUTPATIENT
Start: 2025-02-06

## 2025-02-06 RX ORDER — FUROSEMIDE 20 MG/1
20 TABLET ORAL DAILY PRN
Qty: 90 TABLET | Refills: 0 | Status: SHIPPED | OUTPATIENT
Start: 2025-02-06

## 2025-02-06 RX ORDER — HYDROXYZINE HYDROCHLORIDE 25 MG/1
25 TABLET, FILM COATED ORAL 3 TIMES DAILY PRN
COMMUNITY

## 2025-02-06 RX ORDER — LOSARTAN POTASSIUM 25 MG/1
25 TABLET ORAL DAILY
Qty: 90 TABLET | Refills: 0 | Status: SHIPPED | OUTPATIENT
Start: 2025-02-06

## 2025-02-06 NOTE — PROGRESS NOTES
Chief Complaint  Establish Care (Wants a general check), Diabetes, and Med Refill    Subjective         Jerome Sandoval presents to [facility] PRIMARY [facility]  History of Present Illness    Has hx of type 2 diaabetes, and depression, was oninsulin and metformin     54-year-old male has type 2 diabetes, bilateral lower extremity edema, hypertension, morbid obesity, sleep apnea on CPAP and history of depression establishing care today.    Patient was following with a provider at his work at [employer], patient has been without health supervision or medication for the last year since he lost his job and health insurance.    Patient was previously on metformin and basal insulin for his type 2 diabetes.  He has been on multiple blood pressure medication previously although he is not sure what he was on last.    Patient complains today of intermittent stinging pain and redness of his glans penis for the last 3 months.  Patient denies penile discharge    Patient also complains of spontaneous recurrent lower extremity skin ulcers that heals and break again.  States he tries to keep them clean do not get infected.  Today states he has ulcers in the left lower extremity.      Patient denies fevers, chills, chest pain, he does have shortness of breath on exertion  Objective     Review of Systems     Past Medical History:   Diagnosis Date    Depression     Diabetes mellitus     ELSIE (generalized anxiety disorder)     History of viral gastroenteritis     Hypertension     Hypertriglyceridemia     Obesity     Obstructive sleep apnea on CPAP     Panic attack         Current Outpatient Medications:     furosemide (LASIX) 20 MG tablet, Take 1 tablet by mouth Daily As Needed (leg swelling)., Disp: 90 tablet, Rfl: 0    atorvastatin (Lipitor) 20 MG tablet, Take 1 tablet by mouth Daily., Disp: 90 tablet, Rfl: 3    B Complex-C-Folic Acid (B COMPLEX + C TR PO), Take 1 tablet by mouth daily. (Patient not taking: Reported on 2/6/2025),  Disp: , Rfl:     glucosamine-chondroitin 500-400 MG capsule capsule, Take 1,200 capsules by mouth 2 (two) times a day with meals. (Patient not taking: Reported on 2/6/2025), Disp: , Rfl:     hydrOXYzine (ATARAX) 25 MG tablet, Take 1 tablet by mouth 3 (Three) Times a Day As Needed for Itching., Disp: , Rfl:     losartan (Cozaar) 25 MG tablet, Take 1 tablet by mouth Daily., Disp: 90 tablet, Rfl: 0    metFORMIN (GLUCOPHAGE) 500 MG tablet, Week 1 and 2 : take 1 tablet before breakfast and 1 tablet at nighttime.  Week 3 and 4: Take 2 tablets before breakfast and 1 tablet at nighttime, Week 5 and after: take 2 tabs before breakfast and 2 tablets at bedtime, Disp: 180 tablet, Rfl: 0    miconazole (MICOTIN) 2 % cream, Apply 1 Application topically to the appropriate area as directed 2 (Two) Times a Day for 14 days., Disp: 35 g, Rfl: 0    MULTIPLE VITAMIN PO, Take 1 tablet by mouth daily. (Patient not taking: Reported on 2/6/2025), Disp: , Rfl:     Omega-3 Fatty Acids (FISH OIL) 1200 MG capsule capsule, Take 1 capsule by mouth daily. (Patient not taking: Reported on 2/6/2025), Disp: , Rfl:     Semaglutide,0.25 or 0.5MG/DOS, (OZEMPIC) 2 MG/3ML solution pen-injector, Inject 0.25 mg under the skin into the appropriate area as directed Every 7 (Seven) Days for 28 days., Disp: 1.5 mL, Rfl: 0    [START ON 3/6/2025] Semaglutide,0.25 or 0.5MG/DOS, (OZEMPIC) 2 MG/3ML solution pen-injector, Inject 0.5 mg under the skin into the appropriate area as directed 1 (One) Time Per Week for 28 days., Disp: 3 mL, Rfl: 0    sertraline (ZOLOFT) 100 MG tablet, TAKE 1 TABLET BY MOUTH EVERY DAY (Patient not taking: Reported on 2/6/2025), Disp: 90 tablet, Rfl: 1   Social History     Socioeconomic History    Marital status:     Number of children: 2   Tobacco Use    Smoking status: Never    Smokeless tobacco: Never   Vaping Use    Vaping status: Never Used   Substance and Sexual Activity    Alcohol use: No    Drug use: No    Sexual activity:  "Not Currently     Partners: Female     Birth control/protection: Condom, Spermicide      Vital Signs:   /98 (BP Location: Left arm, Patient Position: Sitting)   Pulse 98   Temp 98.3 °F (36.8 °C)   Resp 16   Ht 188 cm (74\")   Wt (!) 190 kg (418 lb 3.2 oz)   SpO2 97%   BMI 53.69 kg/m²       Physical Exam  Chaperone present: Patient declined chaperone.   Constitutional:       Appearance: He is obese. He is not ill-appearing.   Cardiovascular:      Rate and Rhythm: Normal rate and regular rhythm.      Pulses: Normal pulses.      Heart sounds: No murmur heard.  Pulmonary:      Effort: Pulmonary effort is normal.   Genitourinary:     Comments: Redness and swelling of the glans penis, no purulent discharge, no penis discharge  Musculoskeletal:      Comments: Bilateral lower extremity edema left worse than right with stasis dermatitis of bilateral lower extremity, left worse than right  1 bleeding skin ulcer on the posterior surface of his left lower extremity, stage undetermined  2 healed skin ulcer on the posterior surface of the left lower extremity   Neurological:      Mental Status: He is alert.          Result Review :                 Latest Reference Range & Units 11/13/19 15:26 02/06/25 11:37   Sodium 134 - 144 mmol/L 139    Potassium 3.5 - 5.2 mmol/L 4.6    Chloride 96 - 106 mmol/L 99    CO2 20 - 29 mmol/L 23    BUN 6 - 24 mg/dL 12    Creatinine 0.76 - 1.27 mg/dL 0.95    BUN/Creatinine Ratio 9 - 20  13    Glucose 65 - 99 mg/dL 98    Calcium 8.7 - 10.2 mg/dL 9.6    Alkaline Phosphatase 39 - 117 IU/L 69    Total Protein 6.0 - 8.5 g/dL 7.0    Albumin 3.5 - 5.5 g/dL 4.3    A/G Ratio 1.2 - 2.2  1.6    AST (SGOT) 0 - 40 IU/L 28    ALT (SGPT) 0 - 44 IU/L 28    Total Bilirubin 0.0 - 1.2 mg/dL 0.6    eGFR Non African Am >59 mL/min/1.73 94    eGFR African Am >59 mL/min/1.73 109    Hemoglobin A1C 4.5 - 5.7 %  11.2 !   Total Cholesterol 100 - 199 mg/dL 169    HDL Cholesterol >39 mg/dL 37 (L)    LDL Cholesterol  0 " - 99 mg/dL 101 (H)    VLDL Cholesterol 5 - 40 mg/dL 31    Triglycerides 0 - 149 mg/dL 155 (H)    Globulin 1.5 - 4.5 g/dL 2.7    WBC 3.4 - 10.8 x10E3/uL 8.2    RBC 4.14 - 5.80 x10E6/uL 5.07    Hemoglobin 13.0 - 17.7 g/dL 14.7    Hematocrit 37.5 - 51.0 % 42.9    Platelets 150 - 450 x10E3/uL 232    RDW 12.3 - 15.4 % 13.9    MCV 79 - 97 fL 85    MCH 26.6 - 33.0 pg 29.0    MCHC 31.5 - 35.7 g/dL 34.3    Color, UA Yellow  Yellow    Appearance, UA Clear  Clear    Specific Gravity, UA 1.005 - 1.030  1.022    pH, UA 5.0 - 7.5  5.0    Glucose Negative  Negative    Ketones, UA Negative  Negative    Blood, UA Negative  Trace !    Nitrite, UA Negative  Positive !    Leukocytes, UA Negative  2+ !    Protein, UA Negative/Trace  Negative    Bilirubin, UA Negative  Negative    RBC, UA 0 - 2 /hpf 3-10 !    WBC, UA 0 - 5 /hpf >30 !    Bacteria, UA None seen/Few  Few    Mucus, UA Not Estab.  Present    Epithelial Cells (non renal) 0 - 10 /hpf 0-10    Microscopic Examination  See below:    !: Data is abnormal  (L): Data is abnormally low  (H): Data is abnormally high     Assessment and Plan    Diagnoses and all orders for this visit:    1. Type 2 diabetes mellitus with hyperglycemia, unspecified whether long term insulin use (Primary)  -     Lipid Panel  -     CBC & Differential  -     Comprehensive Metabolic Panel  -     POC Glycosylated Hemoglobin (Hb A1C)  -     metFORMIN (GLUCOPHAGE) 500 MG tablet; Week 1 and 2 : take 1 tablet before breakfast and 1 tablet at nighttime.  Week 3 and 4: Take 2 tablets before breakfast and 1 tablet at nighttime, Week 5 and after: take 2 tabs before breakfast and 2 tablets at bedtime  Dispense: 180 tablet; Refill: 0  -     Microalbumin / Creatinine Urine Ratio - Urine, Clean Catch  -     atorvastatin (Lipitor) 20 MG tablet; Take 1 tablet by mouth Daily.  Dispense: 90 tablet; Refill: 3  -     Semaglutide,0.25 or 0.5MG/DOS, (OZEMPIC) 2 MG/3ML solution pen-injector; Inject 0.25 mg under the skin into the  appropriate area as directed Every 7 (Seven) Days for 28 days.  Dispense: 1.5 mL; Refill: 0  -     Semaglutide,0.25 or 0.5MG/DOS, (OZEMPIC) 2 MG/3ML solution pen-injector; Inject 0.5 mg under the skin into the appropriate area as directed 1 (One) Time Per Week for 28 days.  Dispense: 3 mL; Refill: 0    2. Primary hypertension  -     Lipid Panel  -     CBC & Differential  -     Comprehensive Metabolic Panel  -     TSH Rfx On Abnormal To Free T4  -     losartan (Cozaar) 25 MG tablet; Take 1 tablet by mouth Daily.  Dispense: 90 tablet; Refill: 0  -     Discontinue: furosemide (LASIX) 40 MG tablet; Take 1 tablet by mouth Daily As Needed (leg swelling).  Dispense: 90 tablet; Refill: 0  -     furosemide (LASIX) 20 MG tablet; Take 1 tablet by mouth Daily As Needed (leg swelling).  Dispense: 90 tablet; Refill: 0    3. Bilateral edema of lower extremity  -     Discontinue: furosemide (LASIX) 40 MG tablet; Take 1 tablet by mouth Daily As Needed (leg swelling).  Dispense: 90 tablet; Refill: 0  -     furosemide (LASIX) 20 MG tablet; Take 1 tablet by mouth Daily As Needed (leg swelling).  Dispense: 90 tablet; Refill: 0    4. Ulcer of left lower extremity, limited to breakdown of skin  -     Ambulatory Referral to Wound Clinic    5. Localized swelling of left lower extremity  -     Duplex Venous Lower Extremity - Left CAR; Future    6. Balanitis  -     miconazole (MICOTIN) 2 % cream; Apply 1 Application topically to the appropriate area as directed 2 (Two) Times a Day for 14 days.  Dispense: 35 g; Refill: 0    7. Colon cancer screening  -     Cologuard - Stool, Per Rectum; Future        Type 2 diabetes/morbid obesity  POCT A1c 11.2   Check routine labs  I believe patient is a candidate for GLP-1 injections, he denies personal or family history of pancreatic cancer, thyroid cancer or adrenal cancer or MEN 2  Start Ozempic 0.25 mg, sample given in the office, discussed side effects with the patient  To start metformin 500 mg twice  daily and gradually increase to 1 g twice daily  Start Lipitor 20 mg    Primary hypertension  Blood pressure 140/98 in office today  Check routine labs  I will start patient on losartan 25 mg  Start Lasix 20 mg daily    Bilateral lower extremity edema/stasis dermatitis/skin ulcer  Start Lasix 20 mg daily  Left lower extremity venous Doppler to rule out DVT  Refer to wound clinic    Balanitis  Likely fungal due to uncontrolled diabetes   Start miconazole cream for 2 weeks  Advised patient to start over-the-counter bacitracin if miconazole did not help after 1 week.    Routine health maintenance  Cologuard for colon cancer screening    Follow-up in 6 weeks for reevaluation      Follow Up   Return in about 6 weeks (around 3/20/2025) for Recheck.  Patient was given instructions and counseling regarding his condition or for health maintenance advice. Please see specific information pulled into the AVS if appropriate.

## 2025-02-10 ENCOUNTER — LAB REQUISITION (OUTPATIENT)
Dept: LAB | Facility: HOSPITAL | Age: 55
End: 2025-02-10
Payer: MEDICAID

## 2025-02-10 ENCOUNTER — OFFICE VISIT (OUTPATIENT)
Dept: WOUND CARE | Facility: HOSPITAL | Age: 55
End: 2025-02-10
Payer: MEDICAID

## 2025-02-10 DIAGNOSIS — L02.416 CUTANEOUS ABSCESS OF LEFT LOWER LIMB: ICD-10-CM

## 2025-02-10 PROCEDURE — 97602 WOUND(S) CARE NON-SELECTIVE: CPT

## 2025-02-10 PROCEDURE — 87070 CULTURE OTHR SPECIMN AEROBIC: CPT | Performed by: NURSE PRACTITIONER

## 2025-02-10 PROCEDURE — 87075 CULTR BACTERIA EXCEPT BLOOD: CPT | Performed by: NURSE PRACTITIONER

## 2025-02-10 PROCEDURE — 87181 SC STD AGAR DILUTION PER AGT: CPT | Performed by: NURSE PRACTITIONER

## 2025-02-10 PROCEDURE — 87186 SC STD MICRODIL/AGAR DIL: CPT | Performed by: NURSE PRACTITIONER

## 2025-02-10 PROCEDURE — 87015 SPECIMEN INFECT AGNT CONCNTJ: CPT | Performed by: NURSE PRACTITIONER

## 2025-02-10 PROCEDURE — G0463 HOSPITAL OUTPT CLINIC VISIT: HCPCS

## 2025-02-10 PROCEDURE — 87077 CULTURE AEROBIC IDENTIFY: CPT | Performed by: NURSE PRACTITIONER

## 2025-02-10 PROCEDURE — 87205 SMEAR GRAM STAIN: CPT | Performed by: NURSE PRACTITIONER

## 2025-02-14 LAB
BACTERIA SPEC AEROBE CULT: ABNORMAL
GRAM STN SPEC: ABNORMAL
GRAM STN SPEC: ABNORMAL

## 2025-02-15 LAB — BACTERIA SPEC ANAEROBE CULT: ABNORMAL

## 2025-02-24 ENCOUNTER — TELEPHONE (OUTPATIENT)
Dept: FAMILY MEDICINE CLINIC | Facility: CLINIC | Age: 55
End: 2025-02-24
Payer: MEDICAID

## 2025-02-25 ENCOUNTER — HOSPITAL ENCOUNTER (OUTPATIENT)
Dept: CARDIOLOGY | Facility: HOSPITAL | Age: 55
Discharge: HOME | End: 2025-02-25
Admitting: FAMILY MEDICINE
Payer: MEDICAID

## 2025-02-25 DIAGNOSIS — R22.42 LOCALIZED SWELLING OF LEFT LOWER EXTREMITY: ICD-10-CM

## 2025-02-25 LAB
BH CV LOWER VASCULAR LEFT COMMON FEMORAL AUGMENT: NORMAL
BH CV LOWER VASCULAR LEFT COMMON FEMORAL COMPETENT: NORMAL
BH CV LOWER VASCULAR LEFT COMMON FEMORAL COMPRESS: NORMAL
BH CV LOWER VASCULAR LEFT COMMON FEMORAL PHASIC: NORMAL
BH CV LOWER VASCULAR LEFT COMMON FEMORAL SPONT: NORMAL
BH CV LOWER VASCULAR LEFT DISTAL FEMORAL COMPRESS: NORMAL
BH CV LOWER VASCULAR LEFT GASTRONEMIUS COMPRESS: NORMAL
BH CV LOWER VASCULAR LEFT GREATER SAPH AK COMPRESS: NORMAL
BH CV LOWER VASCULAR LEFT GREATER SAPH BK COMPRESS: NORMAL
BH CV LOWER VASCULAR LEFT LESSER SAPH COMPRESS: NORMAL
BH CV LOWER VASCULAR LEFT MID FEMORAL AUGMENT: NORMAL
BH CV LOWER VASCULAR LEFT MID FEMORAL COMPETENT: NORMAL
BH CV LOWER VASCULAR LEFT MID FEMORAL COMPRESS: NORMAL
BH CV LOWER VASCULAR LEFT MID FEMORAL PHASIC: NORMAL
BH CV LOWER VASCULAR LEFT MID FEMORAL SPONT: NORMAL
BH CV LOWER VASCULAR LEFT PERONEAL COMPRESS: NORMAL
BH CV LOWER VASCULAR LEFT POPLITEAL AUGMENT: NORMAL
BH CV LOWER VASCULAR LEFT POPLITEAL COMPETENT: NORMAL
BH CV LOWER VASCULAR LEFT POPLITEAL COMPRESS: NORMAL
BH CV LOWER VASCULAR LEFT POPLITEAL PHASIC: NORMAL
BH CV LOWER VASCULAR LEFT POPLITEAL SPONT: NORMAL
BH CV LOWER VASCULAR LEFT POSTERIOR TIBIAL COMPRESS: NORMAL
BH CV LOWER VASCULAR LEFT PROFUNDA FEMORAL COMPRESS: NORMAL
BH CV LOWER VASCULAR LEFT PROXIMAL FEMORAL COMPRESS: NORMAL
BH CV LOWER VASCULAR LEFT SAPHENOFEMORAL JUNCTION COMPRESS: NORMAL
BH CV LOWER VASCULAR RIGHT COMMON FEMORAL AUGMENT: NORMAL
BH CV LOWER VASCULAR RIGHT COMMON FEMORAL COMPETENT: NORMAL
BH CV LOWER VASCULAR RIGHT COMMON FEMORAL COMPRESS: NORMAL
BH CV LOWER VASCULAR RIGHT COMMON FEMORAL PHASIC: NORMAL
BH CV LOWER VASCULAR RIGHT COMMON FEMORAL SPONT: NORMAL

## 2025-02-25 PROCEDURE — 93971 EXTREMITY STUDY: CPT | Performed by: SURGERY

## 2025-02-25 PROCEDURE — 93971 EXTREMITY STUDY: CPT

## 2025-02-28 LAB
ALBUMIN SERPL-MCNC: 3.8 G/DL (ref 3.5–5.2)
ALBUMIN/GLOB SERPL: 1.6 G/DL
ALP SERPL-CCNC: 111 U/L (ref 39–117)
ALT SERPL-CCNC: 25 U/L (ref 1–41)
AST SERPL-CCNC: 22 U/L (ref 1–40)
BASOPHILS # BLD AUTO: 0.04 10*3/MM3 (ref 0–0.2)
BASOPHILS NFR BLD AUTO: 0.6 % (ref 0–1.5)
BILIRUB SERPL-MCNC: 0.4 MG/DL (ref 0–1.2)
BUN SERPL-MCNC: 13 MG/DL (ref 6–20)
BUN/CREAT SERPL: 15.5 (ref 7–25)
CALCIUM SERPL-MCNC: 9.7 MG/DL (ref 8.6–10.5)
CHLORIDE SERPL-SCNC: 99 MMOL/L (ref 98–107)
CHOLEST SERPL-MCNC: 99 MG/DL (ref 0–200)
CO2 SERPL-SCNC: 28.2 MMOL/L (ref 22–29)
CREAT SERPL-MCNC: 0.84 MG/DL (ref 0.76–1.27)
EGFRCR SERPLBLD CKD-EPI 2021: 104.3 ML/MIN/1.73
EOSINOPHIL # BLD AUTO: 0.13 10*3/MM3 (ref 0–0.4)
EOSINOPHIL NFR BLD AUTO: 2.1 % (ref 0.3–6.2)
ERYTHROCYTE [DISTWIDTH] IN BLOOD BY AUTOMATED COUNT: 13.3 % (ref 12.3–15.4)
GLOBULIN SER CALC-MCNC: 2.4 GM/DL
GLUCOSE SERPL-MCNC: 263 MG/DL (ref 65–99)
HCT VFR BLD AUTO: 44.6 % (ref 37.5–51)
HDLC SERPL-MCNC: 28 MG/DL (ref 40–60)
HGB BLD-MCNC: 14.9 G/DL (ref 13–17.7)
IMM GRANULOCYTES # BLD AUTO: 0.04 10*3/MM3 (ref 0–0.05)
IMM GRANULOCYTES NFR BLD AUTO: 0.6 % (ref 0–0.5)
LDLC SERPL CALC-MCNC: 6 MG/DL (ref 0–100)
LYMPHOCYTES # BLD AUTO: 1.63 10*3/MM3 (ref 0.7–3.1)
LYMPHOCYTES NFR BLD AUTO: 25.9 % (ref 19.6–45.3)
MCH RBC QN AUTO: 28.4 PG (ref 26.6–33)
MCHC RBC AUTO-ENTMCNC: 33.4 G/DL (ref 31.5–35.7)
MCV RBC AUTO: 85.1 FL (ref 79–97)
MONOCYTES # BLD AUTO: 0.57 10*3/MM3 (ref 0.1–0.9)
MONOCYTES NFR BLD AUTO: 9 % (ref 5–12)
NEUTROPHILS # BLD AUTO: 3.89 10*3/MM3 (ref 1.7–7)
NEUTROPHILS NFR BLD AUTO: 61.8 % (ref 42.7–76)
NRBC BLD AUTO-RTO: 0 /100 WBC (ref 0–0.2)
PLATELET # BLD AUTO: 196 10*3/MM3 (ref 140–450)
POTASSIUM SERPL-SCNC: 4.7 MMOL/L (ref 3.5–5.2)
PROT SERPL-MCNC: 6.2 G/DL (ref 6–8.5)
RBC # BLD AUTO: 5.24 10*6/MM3 (ref 4.14–5.8)
SODIUM SERPL-SCNC: 139 MMOL/L (ref 136–145)
TRIGL SERPL-MCNC: 507 MG/DL (ref 0–150)
TSH SERPL DL<=0.005 MIU/L-ACNC: 2.16 UIU/ML (ref 0.27–4.2)
VLDLC SERPL CALC-MCNC: 65 MG/DL (ref 5–40)
WBC # BLD AUTO: 6.3 10*3/MM3 (ref 3.4–10.8)

## 2025-03-04 ENCOUNTER — TELEPHONE (OUTPATIENT)
Dept: FAMILY MEDICINE CLINIC | Facility: CLINIC | Age: 55
End: 2025-03-04
Payer: MEDICAID

## 2025-03-04 DIAGNOSIS — E11.65 TYPE 2 DIABETES MELLITUS WITH HYPERGLYCEMIA, UNSPECIFIED WHETHER LONG TERM INSULIN USE: ICD-10-CM

## 2025-03-04 NOTE — TELEPHONE ENCOUNTER
Patient was informed with Dr Boo's message regarding his labs.   Please inform patient with his lab results  Patient has very high level of triglycerides, his bad cholesterol level is low, however this could be falsely low due to the very high level of triglyceride.  Continue Lipitor 20 mg     Normal blood count, no anemia     Normal kidney and liver     Fasting sugar is very elevated, is he taking the Ozempic and metformin?     Normal thyroid test        He stated that he is taking Metformin and Ozempic as prescribed.

## 2025-03-04 NOTE — TELEPHONE ENCOUNTER
[facility] is requesting a 90 day prescription request.   Metformin 500 mg, quantity 540 tablets.     Please advise

## 2025-03-07 ENCOUNTER — TELEPHONE (OUTPATIENT)
Dept: SURGERY | Facility: CLINIC | Age: 55
End: 2025-03-07

## 2025-03-07 NOTE — TELEPHONE ENCOUNTER
Hub staff attempted to follow warm transfer process and was unsuccessful     Caller: Jerome Sandoval    Relationship to patient: Self    Best call back number: [phone]    Patient is needing: PT CALLED BACK TO [facility] GARRIDO/ [facility].

## 2025-03-17 ENCOUNTER — OFFICE VISIT (OUTPATIENT)
Dept: FAMILY MEDICINE CLINIC | Facility: CLINIC | Age: 55
End: 2025-03-17
Payer: MEDICAID

## 2025-03-17 VITALS
HEIGHT: 74 IN | OXYGEN SATURATION: 96 % | DIASTOLIC BLOOD PRESSURE: 80 MMHG | SYSTOLIC BLOOD PRESSURE: 120 MMHG | TEMPERATURE: 98.2 F | RESPIRATION RATE: 18 BRPM | HEART RATE: 100 BPM | BODY MASS INDEX: 40.43 KG/M2 | WEIGHT: 315 LBS

## 2025-03-17 DIAGNOSIS — Z23 IMMUNIZATION DUE: ICD-10-CM

## 2025-03-17 DIAGNOSIS — F41.1 GAD (GENERALIZED ANXIETY DISORDER): ICD-10-CM

## 2025-03-17 DIAGNOSIS — E11.65 TYPE 2 DIABETES MELLITUS WITH HYPERGLYCEMIA, UNSPECIFIED WHETHER LONG TERM INSULIN USE: ICD-10-CM

## 2025-03-17 DIAGNOSIS — R45.851 VERBALIZES SUICIDAL THOUGHTS: ICD-10-CM

## 2025-03-17 DIAGNOSIS — F32.2 CURRENT SEVERE EPISODE OF MAJOR DEPRESSIVE DISORDER WITHOUT PSYCHOTIC FEATURES, UNSPECIFIED WHETHER RECURRENT: Primary | ICD-10-CM

## 2025-03-17 PROCEDURE — 99214 OFFICE O/P EST MOD 30 MIN: CPT | Performed by: FAMILY MEDICINE

## 2025-03-17 PROCEDURE — 3046F HEMOGLOBIN A1C LEVEL >9.0%: CPT | Performed by: FAMILY MEDICINE

## 2025-03-17 PROCEDURE — 1125F AMNT PAIN NOTED PAIN PRSNT: CPT | Performed by: FAMILY MEDICINE

## 2025-03-17 RX ORDER — ESCITALOPRAM OXALATE 10 MG/1
10 TABLET ORAL DAILY
Qty: 60 TABLET | Refills: 0 | Status: SHIPPED | OUTPATIENT
Start: 2025-03-17

## 2025-03-17 RX ORDER — HYDROXYZINE HYDROCHLORIDE 25 MG/1
25 TABLET, FILM COATED ORAL 3 TIMES DAILY PRN
Qty: 30 TABLET | Refills: 0 | Status: SHIPPED | OUTPATIENT
Start: 2025-03-17

## 2025-03-17 NOTE — PROGRESS NOTES
Chief Complaint  Insomnia    Subjective         Jerome Sandoval presents to [facility]  History of Present Illness    54-year-old patient with uncontrolled type 2 diabetes, hypertension, morbid obesity complains of insomnia, depression and anxiety.    Patient states he has been having poor sleep for the last 3 days, he wakes up at night to go to the bathroom and cannot fall back asleep.  Patient does have a history of major depression and was previously started on Zoloft by his previous PCP, he was on Zoloft 100 mg, patient states this did not improve his symptoms.  Patient does report recurrent suicidal thoughts, denies any intent to commit suicide.  Patient states he has had depression and suicidal thoughts for a very long time.  Patient denies hallucinations, delusions, mood swings.  Patient patient did not attempt any suicidal attempts in the past.  Patient does not on any weapons at home.      Patient has hypertension, type 2 diabetes, hyperlipidemia, obesity and chronic bilateral lower extremity swelling.    Patient was started on metformin, Ozempic, Lasix, losartan and Lipitor, patient stated he is taking all medication as prescribed and is tolerating medication well    Objective     Review of Systems     Past Medical History:   Diagnosis Date    Arthritis     Depression     Diabetes mellitus     ELSIE (generalized anxiety disorder)     History of medical problems     Both knees are bad    History of viral gastroenteritis     Hypertension     Hypertriglyceridemia     Obesity     Obstructive sleep apnea on CPAP     Panic attack         Current Outpatient Medications:     atorvastatin (Lipitor) 20 MG tablet, Take 1 tablet by mouth Daily., Disp: 90 tablet, Rfl: 3    furosemide (LASIX) 20 MG tablet, Take 1 tablet by mouth Daily As Needed (leg swelling)., Disp: 90 tablet, Rfl: 0    losartan (Cozaar) 25 MG tablet, Take 1 tablet by mouth Daily., Disp: 90 tablet, Rfl: 0    metFORMIN  "(GLUCOPHAGE) 500 MG tablet, Week 1 and 2 : take 1 tablet before breakfast and 1 tablet at nighttime.  Week 3 and 4: Take 2 tablets before breakfast and 1 tablet at nighttime, Week 5 and after: take 2 tabs before breakfast and 2 tablets at bedtime, Disp: 180 tablet, Rfl: 0    Semaglutide,0.25 or 0.5MG/DOS, (OZEMPIC) 2 MG/3ML solution pen-injector, Inject 0.5 mg under the skin into the appropriate area as directed 1 (One) Time Per Week for 28 days., Disp: 3 mL, Rfl: 0    B Complex-C-Folic Acid (B COMPLEX + C TR PO), Take 1 tablet by mouth daily. (Patient not taking: Reported on 2/6/2025), Disp: , Rfl:     escitalopram (Lexapro) 10 MG tablet, Take 1 tablet by mouth Daily., Disp: 60 tablet, Rfl: 0    hydrOXYzine (ATARAX) 25 MG tablet, Take 1 tablet by mouth 3 (Three) Times a Day As Needed for Anxiety., Disp: 30 tablet, Rfl: 0    Omega-3 Fatty Acids (FISH OIL) 1200 MG capsule capsule, Take 1 capsule by mouth Daily., Disp: , Rfl:     [START ON 4/3/2025] Semaglutide, 1 MG/DOSE, (OZEMPIC) 2 MG/1.5ML solution pen-injector, Inject 1 mg under the skin into the appropriate area as directed 1 (One) Time Per Week for 28 days., Disp: 3 mL, Rfl: 0   Social History     Socioeconomic History    Marital status:     Number of children: 2   Tobacco Use    Smoking status: Never    Smokeless tobacco: Never   Vaping Use    Vaping status: Never Used   Substance and Sexual Activity    Alcohol use: No    Drug use: No    Sexual activity: Not Currently     Partners: Female     Birth control/protection: Condom, Spermicide      Vital Signs:   /80 (BP Location: Left arm, Patient Position: Sitting)   Pulse 100   Temp 98.2 °F (36.8 °C)   Resp 18   Ht 188 cm (74\")   Wt (!) 189 kg (417 lb)   SpO2 96%   BMI 53.54 kg/m²       Physical Exam  Constitutional:       Appearance: He is obese.   Cardiovascular:      Rate and Rhythm: Normal rate and regular rhythm.      Heart sounds: No murmur heard.  Pulmonary:      Effort: Pulmonary " effort is normal.      Breath sounds: Decreased breath sounds present.   Musculoskeletal:      Right lower leg: Edema present.      Left lower leg: Edema (Wearing Unna boot) present.   Neurological:      Mental Status: He is alert.          Result Review :               Latest Reference Range & Units 02/28/25 08:00   Sodium 136 - 145 mmol/L 139   Potassium 3.5 - 5.2 mmol/L 4.7   Chloride 98 - 107 mmol/L 99   CO2 22.0 - 29.0 mmol/L 28.2   BUN 6 - 20 mg/dL 13   Creatinine 0.76 - 1.27 mg/dL 0.84   BUN/Creatinine Ratio 7.0 - 25.0  15.5   EGFR Result >60.0 mL/min/1.73 104.3   Glucose 65 - 99 mg/dL 263 (H)   Calcium 8.6 - 10.5 mg/dL 9.7   Alkaline Phosphatase 39 - 117 U/L 111   Total Protein 6.0 - 8.5 g/dL 6.2   Albumin 3.5 - 5.2 g/dL 3.8   A/G Ratio g/dL 1.6   AST (SGOT) 1 - 40 U/L 22   ALT (SGPT) 1 - 41 U/L 25   Total Bilirubin 0.0 - 1.2 mg/dL 0.4   TSH Baseline 0.270 - 4.200 uIU/mL 2.160   Total Cholesterol 0 - 200 mg/dL 99   HDL Cholesterol 40 - 60 mg/dL 28 (L)   LDL Cholesterol  0 - 100 mg/dL 6   Triglycerides 0 - 150 mg/dL 507 (H)   VLDL Cholesterol Francisco Javier 5 - 40 mg/dL 65 (H)   Globulin gm/dL 2.4   WBC 3.40 - 10.80 10*3/mm3 6.30   RBC 4.14 - 5.80 10*6/mm3 5.24   Hemoglobin 13.0 - 17.7 g/dL 14.9   Hematocrit 37.5 - 51.0 % 44.6   Platelets 140 - 450 10*3/mm3 196   RDW 12.3 - 15.4 % 13.3   MCV 79.0 - 97.0 fL 85.1   MCH 26.6 - 33.0 pg 28.4   MCHC 31.5 - 35.7 g/dL 33.4   Neutrophil Rel % 42.7 - 76.0 % 61.8   Lymphocyte Rel % 19.6 - 45.3 % 25.9   Monocyte Rel % 5.0 - 12.0 % 9.0   Eosinophil Rel % 0.3 - 6.2 % 2.1   Basophil Rel % 0.0 - 1.5 % 0.6   Immature Granulocyte Rel % 0.0 - 0.5 % 0.6 (H)   Neutrophils Absolute 1.70 - 7.00 10*3/mm3 3.89   Lymphocytes Absolute 0.70 - 3.10 10*3/mm3 1.63   Monocytes Absolute 0.10 - 0.90 10*3/mm3 0.57   Eosinophils Absolute 0.00 - 0.40 10*3/mm3 0.13   Basophils Absolute 0.00 - 0.20 10*3/mm3 0.04   Immature Grans, Absolute 0.00 - 0.05 10*3/mm3 0.04   nRBC 0.0 - 0.2 /100 WBC 0.0   (H): Data is  abnormally high  (L): Data is abnormally low     Assessment and Plan    Diagnoses and all orders for this visit:    1. Current severe episode of major depressive disorder without psychotic features, unspecified whether recurrent (Primary)  -     escitalopram (Lexapro) 10 MG tablet; Take 1 tablet by mouth Daily.  Dispense: 60 tablet; Refill: 0    2. ELSIE (generalized anxiety disorder)  -     hydrOXYzine (ATARAX) 25 MG tablet; Take 1 tablet by mouth 3 (Three) Times a Day As Needed for Anxiety.  Dispense: 30 tablet; Refill: 0    3. Verbalizes suicidal thoughts    4. Type 2 diabetes mellitus with hyperglycemia, unspecified whether long term insulin use  -     Semaglutide, 1 MG/DOSE, (OZEMPIC) 2 MG/1.5ML solution pen-injector; Inject 1 mg under the skin into the appropriate area as directed 1 (One) Time Per Week for 28 days.  Dispense: 3 mL; Refill: 0    5. Immunization due  -     Cancel: Pneumococcal Conjugate Vaccine 20-Valent (PCV20)      Anxiety/depression/suicidal thoughts  2/28/2025 TSH 2.16  Start Lexapro 10 mg  Start hydroxyzine as needed and at bedtime for sleep and anxiety  Follow-up in 4 weeks    Type 2 diabetes  Advised patient to increase metformin to 1 g in the morning and 500 mg every evening for 1 week then 1 g twice daily  Increase Ozempic to 1 mg after completing 4 weeks on 0.5 mg.  Follow-up in 4 weeks          Follow Up   Return in about 4 weeks (around 4/14/2025).  Patient was given instructions and counseling regarding his condition or for health maintenance advice. Please see specific information pulled into the AVS if appropriate.

## 2025-03-18 ENCOUNTER — RESULTS FOLLOW-UP (OUTPATIENT)
Dept: FAMILY MEDICINE CLINIC | Facility: CLINIC | Age: 55
End: 2025-03-18
Payer: MEDICAID

## 2025-03-18 LAB
ALBUMIN/CREAT UR: 12 MG/G CREAT (ref 0–29)
CREAT UR-MCNC: 46.1 MG/DL
MICROALBUMIN UR-MCNC: 5.5 UG/ML

## 2025-03-20 ENCOUNTER — OFFICE VISIT (OUTPATIENT)
Dept: WOUND CARE | Facility: HOSPITAL | Age: 55
End: 2025-03-20
Payer: MEDICAID

## 2025-03-20 PROCEDURE — G0463 HOSPITAL OUTPT CLINIC VISIT: HCPCS

## 2025-04-11 ENCOUNTER — OFFICE VISIT (OUTPATIENT)
Dept: SURGERY | Facility: CLINIC | Age: 55
End: 2025-04-11
Payer: MEDICAID

## 2025-04-11 VITALS
HEART RATE: 69 BPM | OXYGEN SATURATION: 97 % | WEIGHT: 315 LBS | DIASTOLIC BLOOD PRESSURE: 122 MMHG | SYSTOLIC BLOOD PRESSURE: 200 MMHG | HEIGHT: 74 IN | BODY MASS INDEX: 40.43 KG/M2

## 2025-04-11 DIAGNOSIS — S81.802A WOUND OF LEFT LOWER EXTREMITY, INITIAL ENCOUNTER: ICD-10-CM

## 2025-04-11 DIAGNOSIS — I87.8 VENOUS STASIS: ICD-10-CM

## 2025-04-11 DIAGNOSIS — E66.01 MORBID (SEVERE) OBESITY DUE TO EXCESS CALORIES: Primary | ICD-10-CM

## 2025-04-11 DIAGNOSIS — S81.802A WOUND OF LEFT LOWER EXTREMITY, INITIAL ENCOUNTER: Primary | ICD-10-CM

## 2025-04-11 NOTE — LETTER
April 11, 2025     Tammi Boo MD  [address]  [address]  [facility] [address]    Patient: Jerome Sandoval   YOB: 1970   Date of Visit: 4/11/2025     Dear Tammi Boo MD:       Thank you for referring Jerome Sandoval to me for evaluation. Below are the relevant portions of my assessment and plan of care.    If you have questions, please do not hesitate to call me. I look forward to following Jerome along with you.         Sincerely,        Lurdes Isabel DO        CC: No Recipients    Lurdes Isabel DO  04/11/25 1009  Sign when Signing Visit  General Surgery      Patient Care Team:  Tammi Boo MD as PCP - General (Emergency Medicine)    CHIEF COMPLAINT: Left lower extremity wounds    HISTORY OF PRESENT ILLNESS:    This very pleasant 54-year-old male is here with complaints of wounds of his left lower extremity.  He has been seen by the wound center.  Cultures were obtained previously and he was treated with antibiotics.  They start to heal and then they recur.  Sometimes they drain.  He has no fevers or chills.  He has admitted to significant weight gain and issues with depression.  He is going to be seen again by the wound care center soon.  He also cannot feel his feet due to diabetes.  He has recently started medications for diabetes.      Past Medical History:   Diagnosis Date   • Arthritis    • Depression    • Diabetes mellitus    • ELSIE (generalized anxiety disorder)    • History of medical problems     Both knees are bad   • History of viral gastroenteritis    • Hypertension    • Hypertriglyceridemia    • Obesity    • Obstructive sleep apnea on CPAP    • Panic attack      Past Surgical History:   Procedure Laterality Date   • FRACTURE SURGERY     • HERNIA REPAIR     • KNEE SURGERY Left 10/24/2013   • UMBILICAL HERNIA REPAIR     • VASECTOMY       Family History   Problem Relation Age of Onset   • Hypertension Mother         2019.   • Alzheimer's disease Mother    •  "Arthritis Mother            • Heart disease Father 60   • COPD Father    • Depression Father    • Anxiety disorder Father            • No Known Problems Sister    • No Known Problems Sister    • No Known Problems Brother    • No Known Problems Brother    • Colon cancer Neg Hx    • Prostate cancer Neg Hx      Social History     Tobacco Use   • Smoking status: Never   • Smokeless tobacco: Never   Vaping Use   • Vaping status: Never Used   Substance Use Topics   • Alcohol use: No   • Drug use: No     (Not in a hospital admission)    Allergies:  Patient has no known allergies.    REVIEW OF SYSTEMS:  Please see the above history of present illness for pertinent positives and negatives.  The remainder of the patient's systems have been reviewed and are negative.     Vital Signs  Heart Rate:  [69] 69  BP: (200)/(122) 200/122    Flowsheet Rows      Flowsheet Row First Filed Value   Admission Height 188 cm (74.02\") Documented at 2025 0940   Admission Weight 195 kg (430 lb) Documented at 2025 0940             Physical Exam:  Physical Exam   Constitutional: Patient appears well-developed and well-nourished and in no acute distress   Musculoskeletal: Normal posture.  Extremities: His left lower extremity is edematous.  There are several open wounds on the back that do not appear infected.  He has obvious venous stasis dermatitis.  Neurological: Patient is alert and oriented.  Psychological:   Mood and behavior appropriate.      Debilities/Disabilities Identified: None    Emotional Behavior: Appropriate        Assessment & Plan    Venous stasis of left lower extremity  Open wounds of the left lower extremity  Severe morbid obesity  We obtained cultures and placed an Unna boot on the leg.  We will order a CT scan of lower extremity.  I encouraged him to go to the wound care center.  We will put in a referral for bariatric surgery as well.  Thank you for allowing me to participate in the care of this " interesting patient.     Lurdes Isabel DO  04/11/25  10:05 EDT

## 2025-04-11 NOTE — PROGRESS NOTES
General Surgery      Patient Care Team:  Tammi Boo MD as PCP - General (Emergency Medicine)    CHIEF COMPLAINT: Left lower extremity wounds    HISTORY OF PRESENT ILLNESS:    This very pleasant 54-year-old male is here with complaints of wounds of his left lower extremity.  He has been seen by the wound center.  Cultures were obtained previously and he was treated with antibiotics.  They start to heal and then they recur.  Sometimes they drain.  He has no fevers or chills.  He has admitted to significant weight gain and issues with depression.  He is going to be seen again by the wound care center soon.  He also cannot feel his feet due to diabetes.  He has recently started medications for diabetes.      Past Medical History:   Diagnosis Date    Arthritis     Depression     Diabetes mellitus     ELSIE (generalized anxiety disorder)     History of medical problems     Both knees are bad    History of viral gastroenteritis     Hypertension     Hypertriglyceridemia     Obesity     Obstructive sleep apnea on CPAP     Panic attack      Past Surgical History:   Procedure Laterality Date    FRACTURE SURGERY      HERNIA REPAIR      KNEE SURGERY Left 10/24/2013    UMBILICAL HERNIA REPAIR      VASECTOMY       Family History   Problem Relation Age of Onset    Hypertension Mother         2019.    Alzheimer's disease Mother     Arthritis Mother             Heart disease Father 60    COPD Father     Depression Father     Anxiety disorder Father             No Known Problems Sister     No Known Problems Sister     No Known Problems Brother     No Known Problems Brother     Colon cancer Neg Hx     Prostate cancer Neg Hx      Social History     Tobacco Use    Smoking status: Never    Smokeless tobacco: Never   Vaping Use    Vaping status: Never Used   Substance Use Topics    Alcohol use: No    Drug use: No     (Not in a hospital admission)    Allergies:  Patient has no known allergies.    REVIEW OF SYSTEMS:   "Please see the above history of present illness for pertinent positives and negatives.  The remainder of the patient's systems have been reviewed and are negative.     Vital Signs  Heart Rate:  [69] 69  BP: (200)/(122) 200/122    Flowsheet Rows      Flowsheet Row First Filed Value   Admission Height 188 cm (74.02\") Documented at 04/11/2025 0940   Admission Weight 195 kg (430 lb) Documented at 04/11/2025 0940             Physical Exam:  Physical Exam   Constitutional: Patient appears well-developed and well-nourished and in no acute distress   Musculoskeletal: Normal posture.  Extremities: His left lower extremity is edematous.  There are several open wounds on the back that do not appear infected.  He has obvious venous stasis dermatitis.  Neurological: Patient is alert and oriented.  Psychological:   Mood and behavior appropriate.      Debilities/Disabilities Identified: None    Emotional Behavior: Appropriate        Assessment & Plan     Venous stasis of left lower extremity  Open wounds of the left lower extremity  Severe morbid obesity  We obtained cultures and placed an Unna boot on the leg.  We will order a CT scan of lower extremity.  I encouraged him to go to the wound care center.  We will put in a referral for bariatric surgery as well.  Thank you for allowing me to participate in the care of this interesting patient.     Lurdes Isabel DO  04/11/25  10:05 EDT         "

## 2025-04-14 ENCOUNTER — OFFICE VISIT (OUTPATIENT)
Dept: FAMILY MEDICINE CLINIC | Facility: CLINIC | Age: 55
End: 2025-04-14
Payer: MEDICAID

## 2025-04-14 ENCOUNTER — PATIENT ROUNDING (BHMG ONLY) (OUTPATIENT)
Dept: SURGERY | Facility: CLINIC | Age: 55
End: 2025-04-14
Payer: MEDICAID

## 2025-04-14 VITALS
OXYGEN SATURATION: 97 % | HEART RATE: 93 BPM | HEIGHT: 74 IN | WEIGHT: 315 LBS | RESPIRATION RATE: 16 BRPM | SYSTOLIC BLOOD PRESSURE: 132 MMHG | BODY MASS INDEX: 40.43 KG/M2 | TEMPERATURE: 98.7 F | DIASTOLIC BLOOD PRESSURE: 90 MMHG

## 2025-04-14 DIAGNOSIS — G47.00 INSOMNIA, UNSPECIFIED TYPE: ICD-10-CM

## 2025-04-14 DIAGNOSIS — F32.2 CURRENT SEVERE EPISODE OF MAJOR DEPRESSIVE DISORDER WITHOUT PSYCHOTIC FEATURES, UNSPECIFIED WHETHER RECURRENT: ICD-10-CM

## 2025-04-14 DIAGNOSIS — F41.1 GAD (GENERALIZED ANXIETY DISORDER): Primary | ICD-10-CM

## 2025-04-14 LAB
BACTERIA SPEC CULT: NORMAL
MICROORGANISM/AGENT SPEC: NORMAL

## 2025-04-14 PROCEDURE — 99214 OFFICE O/P EST MOD 30 MIN: CPT | Performed by: FAMILY MEDICINE

## 2025-04-14 PROCEDURE — 1126F AMNT PAIN NOTED NONE PRSNT: CPT | Performed by: FAMILY MEDICINE

## 2025-04-14 PROCEDURE — 3046F HEMOGLOBIN A1C LEVEL >9.0%: CPT | Performed by: FAMILY MEDICINE

## 2025-04-14 RX ORDER — ESCITALOPRAM OXALATE 10 MG/1
15 TABLET ORAL DAILY
Qty: 60 TABLET | Refills: 0 | Status: SHIPPED | OUTPATIENT
Start: 2025-04-14

## 2025-04-14 RX ORDER — TRAZODONE HYDROCHLORIDE 50 MG/1
50 TABLET ORAL NIGHTLY
Qty: 30 TABLET | Refills: 1 | Status: SHIPPED | OUTPATIENT
Start: 2025-04-14

## 2025-04-14 RX ORDER — BUSPIRONE HYDROCHLORIDE 5 MG/1
5 TABLET ORAL 2 TIMES DAILY
Qty: 60 TABLET | Refills: 0 | Status: SHIPPED | OUTPATIENT
Start: 2025-04-14

## 2025-04-14 NOTE — PROGRESS NOTES
Chief Complaint  Primary Care Follow-Up and Medication Problem    Subjective         Jerome Sandoval presents to [facility] PRIMARY CARE  History of Present Illness    54-year-old male presents today for anxiety, depression and insomnia follow-up.  Patient was started last month on Lexapro 10 mg and hydroxyzine as needed before bedtime.  Patient states he did not notice any improvement in his symptoms, the hydroxyzine made him feel worse.  Patient denies suicidal thoughts while on Lexapro  Patient stated that after he got the medication remembered that he is taking Lexapro previously, he does not remember he had much improvement on it.  He also has tried Zoloft previously without significant improvement as well.    Patient denies suicidal thoughts today, states he did not have any suicidal thoughts while on Lexapro.  Objective     Review of Systems     Past Medical History:   Diagnosis Date    Arthritis     Depression     Diabetes mellitus     ELSIE (generalized anxiety disorder)     History of medical problems     Both knees are bad    History of viral gastroenteritis     Hypertension     Hypertriglyceridemia     Obesity     Obstructive sleep apnea on CPAP     Panic attack         Current Outpatient Medications:     atorvastatin (Lipitor) 20 MG tablet, Take 1 tablet by mouth Daily., Disp: 90 tablet, Rfl: 3    B Complex-C-Folic Acid (B COMPLEX + C TR PO), Take 1 tablet by mouth Daily., Disp: , Rfl:     escitalopram (Lexapro) 10 MG tablet, Take 1.5 tablets by mouth Daily., Disp: 60 tablet, Rfl: 0    furosemide (LASIX) 20 MG tablet, Take 1 tablet by mouth Daily As Needed (leg swelling)., Disp: 90 tablet, Rfl: 0    losartan (Cozaar) 25 MG tablet, Take 1 tablet by mouth Daily., Disp: 90 tablet, Rfl: 0    metFORMIN (GLUCOPHAGE) 500 MG tablet, Week 1 and 2 : take 1 tablet before breakfast and 1 tablet at nighttime.  Week 3 and 4: Take 2 tablets before breakfast and 1 tablet at nighttime, Week 5 and after:  "take 2 tabs before breakfast and 2 tablets at bedtime, Disp: 180 tablet, Rfl: 0    Semaglutide, 1 MG/DOSE, (OZEMPIC) 2 MG/1.5ML solution pen-injector, Inject 1 mg under the skin into the appropriate area as directed 1 (One) Time Per Week for 28 days., Disp: 3 mL, Rfl: 0    busPIRone (BUSPAR) 5 MG tablet, Take 1 tablet by mouth 2 (Two) Times a Day., Disp: 60 tablet, Rfl: 0    Omega-3 Fatty Acids (FISH OIL) 1200 MG capsule capsule, Take 1 capsule by mouth Daily. (Patient not taking: Reported on 4/14/2025), Disp: , Rfl:     traZODone (DESYREL) 50 MG tablet, Take 1 tablet by mouth Every Night., Disp: 30 tablet, Rfl: 1   Social History     Socioeconomic History    Marital status:     Number of children: 2   Tobacco Use    Smoking status: Never    Smokeless tobacco: Never   Vaping Use    Vaping status: Never Used   Substance and Sexual Activity    Alcohol use: No    Drug use: No    Sexual activity: Not Currently     Partners: Female     Birth control/protection: Condom, Spermicide      Vital Signs:   /90 (BP Location: Right arm, Patient Position: Sitting)   Pulse 93   Temp 98.7 °F (37.1 °C)   Resp 16   Ht 188 cm (74\")   Wt (!) 194 kg (427 lb 6.4 oz)   SpO2 97%   BMI 54.87 kg/m²       Physical Exam  Constitutional:       General: He is not in acute distress.     Appearance: He is obese. He is not ill-appearing.   Neurological:      Mental Status: He is alert.   Psychiatric:         Mood and Affect: Mood normal.         Behavior: Behavior normal.          Result Review :                 Assessment and Plan    Diagnoses and all orders for this visit:    1. ELSIE (generalized anxiety disorder) (Primary)  -     busPIRone (BUSPAR) 5 MG tablet; Take 1 tablet by mouth 2 (Two) Times a Day.  Dispense: 60 tablet; Refill: 0    2. Current severe episode of major depressive disorder without psychotic features, unspecified whether recurrent  -     GeneSight - Swab,  -     escitalopram (Lexapro) 10 MG tablet; Take 1.5 " tablets by mouth Daily.  Dispense: 60 tablet; Refill: 0    3. Insomnia, unspecified type  -     traZODone (DESYREL) 50 MG tablet; Take 1 tablet by mouth Every Night.  Dispense: 30 tablet; Refill: 1      54-year-old male has anxiety, depression and insomnia, previously failed Zoloft treatment, patient did not notice improvement on Lexapro 10 mg and hydroxyzine,, but he denies suicidal thoughts since he was started on the medication which I think is a improvement.  I will attempt increasing Lexapro to 15 mg  Start BuSpar 5 mg twice daily  Start trazodone 50 mg at bedtime  Check GeneSight testing  Follow-up        Follow Up   Return in about 1 month (around 5/14/2025) for Recheck.  Patient was given instructions and counseling regarding his condition or for health maintenance advice. Please see specific information pulled into the AVS if appropriate.

## 2025-04-14 NOTE — PROGRESS NOTES
April 14, 2025      I am  with [facility]  [address] [place] [address] [phone].      I am calling to officially welcome you to our practice and ask about your recent visit. Is this a good time to talk? No. Left voicemail to call back.

## 2025-04-15 DIAGNOSIS — E11.65 TYPE 2 DIABETES MELLITUS WITH HYPERGLYCEMIA, UNSPECIFIED WHETHER LONG TERM INSULIN USE: ICD-10-CM

## 2025-04-15 RX ORDER — SEMAGLUTIDE 2.68 MG/ML
2 INJECTION, SOLUTION SUBCUTANEOUS WEEKLY
Qty: 3 ML | Refills: 1 | OUTPATIENT
Start: 2025-04-15

## 2025-04-15 NOTE — TELEPHONE ENCOUNTER
Rx Refill Note  Requested Prescriptions     Pending Prescriptions Disp Refills    Ozempic, 1 MG/DOSE, 4 MG/3ML solution pen-injector [Pharmacy Med Name: OZEMPIC 4 MG/3 ML (1 MG/DOSE)]       Sig: INJECT 1 MG UNDER THE SKIN INTO THE APPROPRIATE AREA AS DIRECTED 1 (ONE) TIME PER WEEK FOR 28 DAYS.      Last office visit with prescribing clinician: 4/14/2025   Last telemedicine visit with prescribing clinician: Visit date not found   Next office visit with prescribing clinician: 5/14/2025   Please advise this refill

## 2025-04-17 ENCOUNTER — TELEPHONE (OUTPATIENT)
Dept: FAMILY MEDICINE CLINIC | Facility: CLINIC | Age: 55
End: 2025-04-17
Payer: MEDICAID

## 2025-04-17 DIAGNOSIS — F41.1 GAD (GENERALIZED ANXIETY DISORDER): Primary | ICD-10-CM

## 2025-04-17 RX ORDER — DULOXETIN HYDROCHLORIDE 30 MG/1
CAPSULE, DELAYED RELEASE ORAL
Qty: 67 CAPSULE | Refills: 0 | Status: SHIPPED | OUTPATIENT
Start: 2025-04-17 | End: 2025-05-24

## 2025-04-29 DIAGNOSIS — I10 PRIMARY HYPERTENSION: ICD-10-CM

## 2025-04-29 DIAGNOSIS — R60.0 BILATERAL EDEMA OF LOWER EXTREMITY: ICD-10-CM

## 2025-04-29 RX ORDER — FUROSEMIDE 20 MG/1
20 TABLET ORAL DAILY PRN
Qty: 30 TABLET | Refills: 0 | Status: SHIPPED | OUTPATIENT
Start: 2025-04-29

## 2025-04-29 NOTE — TELEPHONE ENCOUNTER
Dr Boo's patient    Rx Refill Note  Requested Prescriptions     Pending Prescriptions Disp Refills    furosemide (LASIX) 20 MG tablet [Pharmacy Med Name: FUROSEMIDE 20 MG TABLET] 90 tablet 0     Sig: TAKE 1 TABLET BY MOUTH DAILY AS NEEDED (LEG SWELLING).      Last office visit with prescribing clinician: 4/14/2025   Last telemedicine visit with prescribing clinician: Visit date not found   Next office visit with prescribing clinician: 5/14/2025     Please advise this refill

## 2025-05-01 ENCOUNTER — OFFICE VISIT (OUTPATIENT)
Dept: WOUND CARE | Facility: HOSPITAL | Age: 55
End: 2025-05-01
Payer: MEDICAID

## 2025-05-04 DIAGNOSIS — R60.0 BILATERAL EDEMA OF LOWER EXTREMITY: ICD-10-CM

## 2025-05-04 DIAGNOSIS — I10 PRIMARY HYPERTENSION: ICD-10-CM

## 2025-05-05 RX ORDER — FUROSEMIDE 40 MG/1
40 TABLET ORAL DAILY PRN
Qty: 90 TABLET | Refills: 0 | OUTPATIENT
Start: 2025-05-05

## 2025-05-05 RX ORDER — LOSARTAN POTASSIUM 25 MG/1
25 TABLET ORAL DAILY
Qty: 90 TABLET | Refills: 0 | Status: SHIPPED | OUTPATIENT
Start: 2025-05-05

## 2025-05-09 ENCOUNTER — TELEPHONE (OUTPATIENT)
Dept: FAMILY MEDICINE CLINIC | Facility: CLINIC | Age: 55
End: 2025-05-09
Payer: MEDICAID

## 2025-05-09 NOTE — TELEPHONE ENCOUNTER
HUB TO RELAY  LEFT PT VM IN REGARDS TO CONFIRMING UPCOMING APPT ON 05/14/2025 AT 9:30. PLEASE CONFIRM IF PT CALLS BACK. THANKS

## 2025-05-14 DIAGNOSIS — F41.1 GAD (GENERALIZED ANXIETY DISORDER): ICD-10-CM

## 2025-05-14 RX ORDER — BUSPIRONE HYDROCHLORIDE 5 MG/1
5 TABLET ORAL 2 TIMES DAILY
Qty: 60 TABLET | Refills: 0 | Status: SHIPPED | OUTPATIENT
Start: 2025-05-14

## 2025-05-14 NOTE — TELEPHONE ENCOUNTER
Rx Refill Note  Requested Prescriptions     Pending Prescriptions Disp Refills    busPIRone (BUSPAR) 5 MG tablet 60 tablet 0     Sig: Take 1 tablet by mouth 2 (Two) Times a Day.      Last office visit with prescribing clinician: 4/14/2025   Last telemedicine visit with prescribing clinician: Visit date not found   Next office visit with prescribing clinician: 5/19/2025     Please advise this refill

## 2025-05-15 DIAGNOSIS — F32.2 CURRENT SEVERE EPISODE OF MAJOR DEPRESSIVE DISORDER WITHOUT PSYCHOTIC FEATURES, UNSPECIFIED WHETHER RECURRENT: ICD-10-CM

## 2025-05-15 NOTE — TELEPHONE ENCOUNTER
Rx Refill Note  Requested Prescriptions     Pending Prescriptions Disp Refills    escitalopram (Lexapro) 10 MG tablet 60 tablet 0     Sig: Take 1.5 tablets by mouth Daily.      Last office visit with prescribing clinician: 4/14/2025   Last telemedicine visit with prescribing clinician: Visit date not found   Next office visit with prescribing clinician: 5/19/2025   Please advise this refill

## 2025-05-18 RX ORDER — ESCITALOPRAM OXALATE 10 MG/1
15 TABLET ORAL DAILY
Qty: 60 TABLET | Refills: 0 | OUTPATIENT
Start: 2025-05-18

## 2025-05-19 ENCOUNTER — TELEPHONE (OUTPATIENT)
Dept: SURGERY | Facility: CLINIC | Age: 55
End: 2025-05-19
Payer: MEDICAID

## 2025-05-19 NOTE — TELEPHONE ENCOUNTER
Rec'd the following message:    Lexy Calloway RegSched Rep Graves, Lea Ann, LPN  Hi Henry Guido from the [facility] called to inform you that this patient's CT was denied by his insurance, as they want him to have a Xray first.  He would like to know if you would like to do a peer to peer for the CT or just have him do an Xray first.    He can be reached at [phone]    Thank you!    Attempt made to reach pt to discuss this change in care plan.  NA/VM.

## 2025-05-22 DIAGNOSIS — F41.1 GAD (GENERALIZED ANXIETY DISORDER): ICD-10-CM

## 2025-05-22 RX ORDER — DULOXETIN HYDROCHLORIDE 60 MG/1
60 CAPSULE, DELAYED RELEASE ORAL DAILY
Qty: 90 CAPSULE | Refills: 0 | Status: SHIPPED | OUTPATIENT
Start: 2025-05-22

## 2025-05-22 NOTE — TELEPHONE ENCOUNTER
Rx Refill Note  Requested Prescriptions     Pending Prescriptions Disp Refills    DULoxetine (CYMBALTA) 30 MG capsule [Pharmacy Med Name: DULOXETINE HCL DR 30 MG CAP] 67 capsule 0     Sig: TAKE 1 CAPSULE BY MOUTH DAILY FOR 7 DAYS, THEN 2 CAPSULES DAILY FOR 30 DAYS.      Last office visit with prescribing clinician: 4/14/2025   Last telemedicine visit with prescribing clinician: Visit date not found   Next office visit with prescribing clinician: Visit date not found   Please advise this refill

## 2025-05-28 ENCOUNTER — TELEPHONE (OUTPATIENT)
Dept: FAMILY MEDICINE CLINIC | Facility: CLINIC | Age: 55
End: 2025-05-28
Payer: MEDICAID

## 2025-05-28 NOTE — TELEPHONE ENCOUNTER
I have been trying to contact this patient 3 times already. His mailbox is full and he is not answering his phone.

## 2025-05-28 NOTE — TELEPHONE ENCOUNTER
I have call and left a voicemail for patient's emergency contact to have patient to call the office.    alert and awake

## 2025-05-28 NOTE — TELEPHONE ENCOUNTER
----- Message from Tammi Boo sent at 5/28/2025  3:22 PM EDT -----  Please call patient and inform him with surgery message.  Also he missed his follow-up appointment with me, please reschedule.  ----- Message -----  From: Anaya Beasley LPN  Sent: 5/28/2025   9:08 AM EDT  To: Tammi Boo MD

## 2025-06-09 ENCOUNTER — OFFICE VISIT (OUTPATIENT)
Dept: FAMILY MEDICINE CLINIC | Facility: CLINIC | Age: 55
End: 2025-06-09
Payer: MEDICAID

## 2025-06-09 VITALS
BODY MASS INDEX: 40.43 KG/M2 | TEMPERATURE: 97.8 F | DIASTOLIC BLOOD PRESSURE: 80 MMHG | WEIGHT: 315 LBS | SYSTOLIC BLOOD PRESSURE: 124 MMHG | OXYGEN SATURATION: 98 % | RESPIRATION RATE: 16 BRPM | HEART RATE: 85 BPM | HEIGHT: 74 IN

## 2025-06-09 DIAGNOSIS — G47.00 INSOMNIA, UNSPECIFIED TYPE: Primary | ICD-10-CM

## 2025-06-09 DIAGNOSIS — F41.1 GAD (GENERALIZED ANXIETY DISORDER): ICD-10-CM

## 2025-06-09 PROCEDURE — 99214 OFFICE O/P EST MOD 30 MIN: CPT | Performed by: FAMILY MEDICINE

## 2025-06-09 PROCEDURE — 1126F AMNT PAIN NOTED NONE PRSNT: CPT | Performed by: FAMILY MEDICINE

## 2025-06-09 PROCEDURE — 3046F HEMOGLOBIN A1C LEVEL >9.0%: CPT | Performed by: FAMILY MEDICINE

## 2025-06-09 RX ORDER — BUSPIRONE HYDROCHLORIDE 10 MG/1
10 TABLET ORAL 2 TIMES DAILY
Qty: 90 TABLET | Refills: 1 | Status: SHIPPED | OUTPATIENT
Start: 2025-06-09

## 2025-06-09 RX ORDER — DESVENLAFAXINE 50 MG/1
50 TABLET, FILM COATED, EXTENDED RELEASE ORAL DAILY
Status: CANCELLED | OUTPATIENT
Start: 2025-06-09

## 2025-06-09 RX ORDER — DULOXETIN HYDROCHLORIDE 60 MG/1
60 CAPSULE, DELAYED RELEASE ORAL DAILY
Start: 2025-06-09 | End: 2025-06-16

## 2025-06-09 RX ORDER — DESVENLAFAXINE 50 MG/1
TABLET, FILM COATED, EXTENDED RELEASE ORAL
Qty: 67 TABLET | Refills: 0 | Status: SHIPPED | OUTPATIENT
Start: 2025-06-09 | End: 2025-07-16

## 2025-06-09 RX ORDER — DULOXETIN HYDROCHLORIDE 30 MG/1
30 CAPSULE, DELAYED RELEASE ORAL DAILY
Qty: 7 CAPSULE | Refills: 0 | Status: SHIPPED | OUTPATIENT
Start: 2025-06-16 | End: 2025-06-23

## 2025-06-09 NOTE — PROGRESS NOTES
Chief Complaint  Primary Care Follow-Up and Insomnia (Patient stated has been up for two days, unable to sleep. )    Subjective         Jerome Sandoval presents to [facility] PRIMARY CARE  History of Present Illness    54-year-old male presents today for anxiety and depression and insomnia follow-up.  Patient is currently on duloxetine 60 mg, BuSpar 5 mg twice daily and trazodone 50 mg    Patient states he has not noticed any significant improvement in his symptoms.  Continues to feel anxious and depressed.  Patient also states he has not been able to sleep very well, is averaging sleep about 4 hours.  Patient states he has not slept for the last 2 days.    Patient admits to having suicidal thoughts on a daily basis but denies any plans or actual intent to commit suicide.  Patient states is more of wishing to be dead.  Patient denies history of suicidal attempts.  Patient denies owning weapons at home.    Objective     Review of Systems     Past Medical History:   Diagnosis Date    Arthritis     Depression     Diabetes mellitus     ELSIE (generalized anxiety disorder)     History of medical problems     Both knees are bad    History of viral gastroenteritis     Hypertension     Hypertriglyceridemia     Obesity     Obstructive sleep apnea on CPAP     Panic attack         Current Outpatient Medications:     atorvastatin (Lipitor) 20 MG tablet, Take 1 tablet by mouth Daily., Disp: 90 tablet, Rfl: 3    busPIRone (BUSPAR) 10 MG tablet, Take 1 tablet by mouth 2 (Two) Times a Day., Disp: 90 tablet, Rfl: 1    DULoxetine (CYMBALTA) 60 MG capsule, Take 1 capsule by mouth Daily for 7 days., Disp: , Rfl:     furosemide (LASIX) 20 MG tablet, TAKE 1 TABLET BY MOUTH DAILY AS NEEDED (LEG SWELLING)., Disp: 30 tablet, Rfl: 0    losartan (COZAAR) 25 MG tablet, TAKE 1 TABLET BY MOUTH EVERY DAY, Disp: 90 tablet, Rfl: 0    metFORMIN (GLUCOPHAGE) 500 MG tablet, Week 1 and 2 : take 1 tablet before breakfast and 1 tablet at  "nighttime.  Week 3 and 4: Take 2 tablets before breakfast and 1 tablet at nighttime, Week 5 and after: take 2 tabs before breakfast and 2 tablets at bedtime, Disp: 180 tablet, Rfl: 0    Semaglutide, 2 MG/DOSE, (OZEMPIC) 8 MG/3ML solution pen-injector, Inject 2 mg under the skin into the appropriate area as directed 1 (One) Time Per Week., Disp: 3 mL, Rfl: 1    traZODone (DESYREL) 50 MG tablet, Take 1 tablet by mouth Every Night., Disp: 30 tablet, Rfl: 1    B Complex-C-Folic Acid (B COMPLEX + C TR PO), Take 1 tablet by mouth Daily. (Patient not taking: Reported on 6/9/2025), Disp: , Rfl:     desvenlafaxine (Pristiq) 50 MG 24 hr tablet, Take 1 tablet by mouth Daily for 7 days, THEN 2 tablets Daily for 30 days., Disp: 67 tablet, Rfl: 0    [START ON 6/16/2025] DULoxetine (CYMBALTA) 30 MG capsule, Take 1 capsule by mouth Daily for 7 days., Disp: 7 capsule, Rfl: 0    Omega-3 Fatty Acids (FISH OIL) 1200 MG capsule capsule, Take 1 capsule by mouth Daily. (Patient not taking: Reported on 6/9/2025), Disp: , Rfl:    Social History     Socioeconomic History    Marital status:     Number of children: 2   Tobacco Use    Smoking status: Never    Smokeless tobacco: Never   Vaping Use    Vaping status: Never Used   Substance and Sexual Activity    Alcohol use: No    Drug use: No    Sexual activity: Not Currently     Partners: Female     Birth control/protection: Condom, Spermicide      Vital Signs:   /80 (BP Location: Right arm, Patient Position: Sitting)   Pulse 85   Temp 97.8 °F (36.6 °C)   Resp 16   Ht 188 cm (74\")   Wt (!) 191 kg (421 lb 12.8 oz)   SpO2 98%   BMI 54.16 kg/m²       Physical Exam  Psychiatric:         Mood and Affect: Affect normal. Mood is depressed.         Speech: Speech normal.         Behavior: Behavior normal. Behavior is cooperative.          Result Review :                 Assessment and Plan    Diagnoses and all orders for this visit:    1. Insomnia, unspecified type (Primary)  -     " Cancel: Ambulatory Referral to Psychiatry    2. ELSIE (generalized anxiety disorder)  -     Cancel: Ambulatory Referral to Psychiatry  -     desvenlafaxine (Pristiq) 50 MG 24 hr tablet; Take 1 tablet by mouth Daily for 7 days, THEN 2 tablets Daily for 30 days.  Dispense: 67 tablet; Refill: 0  -     DULoxetine (CYMBALTA) 60 MG capsule; Take 1 capsule by mouth Daily for 7 days.  -     DULoxetine (CYMBALTA) 30 MG capsule; Take 1 capsule by mouth Daily for 7 days.  Dispense: 7 capsule; Refill: 0  -     busPIRone (BUSPAR) 10 MG tablet; Take 1 tablet by mouth 2 (Two) Times a Day.  Dispense: 90 tablet; Refill: 1      Anxiety/depression  Start desvenlafaxine and wean off Cymbalta.  Week 1 continue Cymbalta 60 mg, start Pristiq 50 mg  Week 2 lowered Cymbalta to 30 mg, increase Pristiq to 100 mg  Weekly 3 stop Cymbalta, continue Pristiq 100 mg  Increase BuSpar to 10 mg twice a day  Discussed serotonin syndrome symptoms and advised patient to call or go to the hospital if he experienced any.    Insomnia  Continue trazodone  I will hold on increasing dose for now to avoid serotonin syndrome   Follow-up in 6 weeks    Follow Up   Return in about 6 weeks (around 7/21/2025) for Recheck.  Patient was given instructions and counseling regarding his condition or for health maintenance advice. Please see specific information pulled into the AVS if appropriate.

## 2025-06-13 DIAGNOSIS — E11.65 TYPE 2 DIABETES MELLITUS WITH HYPERGLYCEMIA, UNSPECIFIED WHETHER LONG TERM INSULIN USE: ICD-10-CM

## 2025-06-13 RX ORDER — SEMAGLUTIDE 2.68 MG/ML
2 INJECTION, SOLUTION SUBCUTANEOUS WEEKLY
Qty: 3 ML | Refills: 3 | Status: SHIPPED | OUTPATIENT
Start: 2025-06-13

## 2025-06-13 NOTE — TELEPHONE ENCOUNTER
Rx Refill Note  Requested Prescriptions     Pending Prescriptions Disp Refills    Ozempic, 2 MG/DOSE, 8 MG/3ML solution pen-injector [Pharmacy Med Name: OZEMPIC 8 MG/3 ML (2 MG/DOSE)]  1     Sig: INJECT 2 MG UNDER THE SKIN INTO THE APPROPRIATE AREA AS DIRECTED 1 (ONE) TIME PER WEEK.      Last office visit with prescribing clinician: 6/9/2025   Last telemedicine visit with prescribing clinician: Visit date not found   Next office visit with prescribing clinician: 7/21/2025   Please advise this refill

## 2025-06-27 ENCOUNTER — TELEPHONE (OUTPATIENT)
Dept: FAMILY MEDICINE CLINIC | Facility: CLINIC | Age: 55
End: 2025-06-27
Payer: MEDICAID

## 2025-07-03 DIAGNOSIS — F41.1 GAD (GENERALIZED ANXIETY DISORDER): ICD-10-CM

## 2025-07-03 RX ORDER — DESVENLAFAXINE 100 MG/1
100 TABLET, EXTENDED RELEASE ORAL DAILY
Qty: 30 TABLET | Refills: 1 | Status: SHIPPED | OUTPATIENT
Start: 2025-07-03

## 2025-07-03 NOTE — TELEPHONE ENCOUNTER
Rx Refill Note  Requested Prescriptions     Pending Prescriptions Disp Refills    desvenlafaxine (Pristiq) 50 MG 24 hr tablet 67 tablet 0     Sig: Take 1 tablet by mouth Daily for 7 days, THEN 2 tablets Daily for 30 days.      Last office visit with prescribing clinician: 6/9/2025   Last telemedicine visit with prescribing clinician: Visit date not found   Next office visit with prescribing clinician: 7/21/2025     Please advise this refill

## 2025-07-22 DIAGNOSIS — F41.1 GAD (GENERALIZED ANXIETY DISORDER): ICD-10-CM

## 2025-07-22 RX ORDER — BUSPIRONE HYDROCHLORIDE 10 MG/1
10 TABLET ORAL 2 TIMES DAILY
Qty: 90 TABLET | Refills: 1 | OUTPATIENT
Start: 2025-07-22

## 2025-07-22 NOTE — TELEPHONE ENCOUNTER
Rx Refill Note  Requested Prescriptions     Pending Prescriptions Disp Refills    busPIRone (BUSPAR) 10 MG tablet 90 tablet 1     Sig: Take 1 tablet by mouth 2 (Two) Times a Day.      Last office visit with prescribing clinician: 6/9/2025   Last telemedicine visit with prescribing clinician: Visit date not found   Next office visit with prescribing clinician: Visit date not found   Please advise this refill